# Patient Record
Sex: FEMALE | Race: WHITE | NOT HISPANIC OR LATINO | Employment: OTHER | URBAN - METROPOLITAN AREA
[De-identification: names, ages, dates, MRNs, and addresses within clinical notes are randomized per-mention and may not be internally consistent; named-entity substitution may affect disease eponyms.]

---

## 2020-04-08 ENCOUNTER — HOSPITAL ENCOUNTER (INPATIENT)
Facility: HOSPITAL | Age: 85
LOS: 6 days | Discharge: NURSING, REHAB OR LONG-TERM CARE | End: 2020-04-14
Attending: INTERNAL MEDICINE | Admitting: INTERNAL MEDICINE

## 2020-04-08 DIAGNOSIS — S22.080A COMPRESSION FRACTURE OF T12 VERTEBRA, INITIAL ENCOUNTER (HCC): Primary | ICD-10-CM

## 2020-04-08 DIAGNOSIS — G47.00 PERSISTENT INSOMNIA: ICD-10-CM

## 2020-04-08 DIAGNOSIS — F41.8 MIXED ANXIETY AND DEPRESSIVE DISORDER: ICD-10-CM

## 2020-04-08 PROBLEM — N95.2 ATROPHIC VAGINITIS: Status: ACTIVE | Noted: 2020-04-08

## 2020-04-08 PROBLEM — R63.4 ABNORMAL WEIGHT LOSS: Status: ACTIVE | Noted: 2020-04-06

## 2020-04-08 PROBLEM — M81.0 OSTEOPOROSIS: Status: ACTIVE | Noted: 2017-10-04

## 2020-04-08 PROBLEM — C50.919 PRIMARY MALIGNANT NEOPLASM OF BREAST (HCC): Status: ACTIVE | Noted: 2019-09-05

## 2020-04-08 PROBLEM — K52.9 CHRONIC DIARRHEA: Status: ACTIVE | Noted: 2020-04-08

## 2020-04-08 PROBLEM — F17.200 TOBACCO DEPENDENCE SYNDROME: Status: ACTIVE | Noted: 2019-09-05

## 2020-04-08 PROBLEM — E87.6 HYPOKALEMIA: Status: ACTIVE | Noted: 2020-04-08

## 2020-04-08 PROBLEM — K76.89 LIVER CYST: Status: ACTIVE | Noted: 2017-11-09

## 2020-04-08 PROBLEM — S22.000A COMPRESSION FRACTURE OF THORACIC VERTEBRA (HCC): Status: ACTIVE | Noted: 2020-04-08

## 2020-04-08 PROBLEM — K91.5 POSTCHOLECYSTECTOMY SYNDROME: Status: ACTIVE | Noted: 2020-04-06

## 2020-04-08 PROBLEM — I10 ESSENTIAL HYPERTENSION: Status: ACTIVE | Noted: 2020-04-06

## 2020-04-08 LAB
ALBUMIN SERPL-MCNC: 3.7 G/DL (ref 3.5–5.2)
ALBUMIN/GLOB SERPL: 1.6 G/DL
ALP SERPL-CCNC: 63 U/L (ref 39–117)
ALT SERPL W P-5'-P-CCNC: 10 U/L (ref 1–33)
ANION GAP SERPL CALCULATED.3IONS-SCNC: 12 MMOL/L (ref 5–15)
AST SERPL-CCNC: 17 U/L (ref 1–32)
BASOPHILS # BLD AUTO: 0 10*3/MM3 (ref 0–0.2)
BASOPHILS NFR BLD AUTO: 0.5 % (ref 0–1.5)
BILIRUB SERPL-MCNC: 0.4 MG/DL (ref 0.2–1.2)
BUN BLD-MCNC: 6 MG/DL (ref 8–23)
BUN/CREAT SERPL: 12.8 (ref 7–25)
CALCIUM SPEC-SCNC: 8.9 MG/DL (ref 8.6–10.5)
CHLORIDE SERPL-SCNC: 104 MMOL/L (ref 98–107)
CO2 SERPL-SCNC: 29 MMOL/L (ref 22–29)
CREAT BLD-MCNC: 0.47 MG/DL (ref 0.57–1)
DEPRECATED RDW RBC AUTO: 44.2 FL (ref 37–54)
EOSINOPHIL # BLD AUTO: 0.1 10*3/MM3 (ref 0–0.4)
EOSINOPHIL NFR BLD AUTO: 0.8 % (ref 0.3–6.2)
ERYTHROCYTE [DISTWIDTH] IN BLOOD BY AUTOMATED COUNT: 14.2 % (ref 12.3–15.4)
GFR SERPL CREATININE-BSD FRML MDRD: 126 ML/MIN/1.73
GLOBULIN UR ELPH-MCNC: 2.3 GM/DL
GLUCOSE BLD-MCNC: 87 MG/DL (ref 65–99)
HCT VFR BLD AUTO: 36.2 % (ref 34–46.6)
HGB BLD-MCNC: 12.5 G/DL (ref 12–15.9)
LYMPHOCYTES # BLD AUTO: 1.8 10*3/MM3 (ref 0.7–3.1)
LYMPHOCYTES NFR BLD AUTO: 22.1 % (ref 19.6–45.3)
MCH RBC QN AUTO: 30.6 PG (ref 26.6–33)
MCHC RBC AUTO-ENTMCNC: 34.6 G/DL (ref 31.5–35.7)
MCV RBC AUTO: 88.3 FL (ref 79–97)
MONOCYTES # BLD AUTO: 0.7 10*3/MM3 (ref 0.1–0.9)
MONOCYTES NFR BLD AUTO: 8.4 % (ref 5–12)
NEUTROPHILS # BLD AUTO: 5.5 10*3/MM3 (ref 1.7–7)
NEUTROPHILS NFR BLD AUTO: 68.2 % (ref 42.7–76)
NRBC BLD AUTO-RTO: 0 /100 WBC (ref 0–0.2)
PLATELET # BLD AUTO: 241 10*3/MM3 (ref 140–450)
PMV BLD AUTO: 9.1 FL (ref 6–12)
POTASSIUM BLD-SCNC: 3.1 MMOL/L (ref 3.5–5.2)
PROT SERPL-MCNC: 6 G/DL (ref 6–8.5)
RBC # BLD AUTO: 4.09 10*6/MM3 (ref 3.77–5.28)
SODIUM BLD-SCNC: 145 MMOL/L (ref 136–145)
WBC NRBC COR # BLD: 8.1 10*3/MM3 (ref 3.4–10.8)

## 2020-04-08 PROCEDURE — 25010000002 ONDANSETRON PER 1 MG: Performed by: PHYSICIAN ASSISTANT

## 2020-04-08 PROCEDURE — 85025 COMPLETE CBC W/AUTO DIFF WBC: CPT | Performed by: PHYSICIAN ASSISTANT

## 2020-04-08 PROCEDURE — 99222 1ST HOSP IP/OBS MODERATE 55: CPT | Performed by: PHYSICIAN ASSISTANT

## 2020-04-08 PROCEDURE — 80053 COMPREHEN METABOLIC PANEL: CPT | Performed by: PHYSICIAN ASSISTANT

## 2020-04-08 RX ORDER — POTASSIUM CHLORIDE 20 MEQ/1
40 TABLET, EXTENDED RELEASE ORAL AS NEEDED
Status: DISCONTINUED | OUTPATIENT
Start: 2020-04-08 | End: 2020-04-09

## 2020-04-08 RX ORDER — CHOLECALCIFEROL (VITAMIN D3) 125 MCG
5 CAPSULE ORAL NIGHTLY PRN
Status: DISCONTINUED | OUTPATIENT
Start: 2020-04-08 | End: 2020-04-14 | Stop reason: HOSPADM

## 2020-04-08 RX ORDER — TEMAZEPAM 15 MG/1
15 CAPSULE ORAL NIGHTLY PRN
Status: ON HOLD | COMMUNITY
End: 2020-04-14 | Stop reason: SDUPTHER

## 2020-04-08 RX ORDER — DEXTROSE MONOHYDRATE 25 G/50ML
25 INJECTION, SOLUTION INTRAVENOUS
Status: DISCONTINUED | OUTPATIENT
Start: 2020-04-08 | End: 2020-04-09

## 2020-04-08 RX ORDER — NICOTINE POLACRILEX 4 MG
15 LOZENGE BUCCAL
Status: DISCONTINUED | OUTPATIENT
Start: 2020-04-08 | End: 2020-04-09

## 2020-04-08 RX ORDER — HYDRALAZINE HYDROCHLORIDE 20 MG/ML
10 INJECTION INTRAMUSCULAR; INTRAVENOUS EVERY 6 HOURS PRN
Status: DISCONTINUED | OUTPATIENT
Start: 2020-04-08 | End: 2020-04-09

## 2020-04-08 RX ORDER — ACETAMINOPHEN 160 MG/5ML
650 SOLUTION ORAL EVERY 4 HOURS PRN
Status: DISCONTINUED | OUTPATIENT
Start: 2020-04-08 | End: 2020-04-09

## 2020-04-08 RX ORDER — SODIUM CHLORIDE 0.9 % (FLUSH) 0.9 %
10 SYRINGE (ML) INJECTION EVERY 12 HOURS SCHEDULED
Status: DISCONTINUED | OUTPATIENT
Start: 2020-04-08 | End: 2020-04-14 | Stop reason: HOSPADM

## 2020-04-08 RX ORDER — DOCUSATE SODIUM 100 MG/1
100 CAPSULE, LIQUID FILLED ORAL 2 TIMES DAILY PRN
Status: DISCONTINUED | OUTPATIENT
Start: 2020-04-08 | End: 2020-04-09

## 2020-04-08 RX ORDER — ONDANSETRON 4 MG/1
4 TABLET, FILM COATED ORAL EVERY 6 HOURS PRN
Status: DISCONTINUED | OUTPATIENT
Start: 2020-04-08 | End: 2020-04-14 | Stop reason: HOSPADM

## 2020-04-08 RX ORDER — SODIUM CHLORIDE, SODIUM LACTATE, POTASSIUM CHLORIDE, CALCIUM CHLORIDE 600; 310; 30; 20 MG/100ML; MG/100ML; MG/100ML; MG/100ML
75 INJECTION, SOLUTION INTRAVENOUS CONTINUOUS
Status: DISCONTINUED | OUTPATIENT
Start: 2020-04-08 | End: 2020-04-09

## 2020-04-08 RX ORDER — TEMAZEPAM 15 MG/1
15 CAPSULE ORAL NIGHTLY PRN
Status: DISCONTINUED | OUTPATIENT
Start: 2020-04-08 | End: 2020-04-14 | Stop reason: HOSPADM

## 2020-04-08 RX ORDER — MAGNESIUM SULFATE HEPTAHYDRATE 40 MG/ML
2 INJECTION, SOLUTION INTRAVENOUS AS NEEDED
Status: DISCONTINUED | OUTPATIENT
Start: 2020-04-08 | End: 2020-04-09

## 2020-04-08 RX ORDER — ONDANSETRON 2 MG/ML
4 INJECTION INTRAMUSCULAR; INTRAVENOUS EVERY 6 HOURS PRN
Status: DISCONTINUED | OUTPATIENT
Start: 2020-04-08 | End: 2020-04-14 | Stop reason: HOSPADM

## 2020-04-08 RX ORDER — MONTELUKAST SODIUM 4 MG/1
1 TABLET, CHEWABLE ORAL 2 TIMES DAILY
COMMUNITY
End: 2020-04-14 | Stop reason: HOSPADM

## 2020-04-08 RX ORDER — BISACODYL 10 MG
10 SUPPOSITORY, RECTAL RECTAL DAILY PRN
Status: DISCONTINUED | OUTPATIENT
Start: 2020-04-08 | End: 2020-04-14 | Stop reason: HOSPADM

## 2020-04-08 RX ORDER — SODIUM CHLORIDE 0.9 % (FLUSH) 0.9 %
10 SYRINGE (ML) INJECTION AS NEEDED
Status: DISCONTINUED | OUTPATIENT
Start: 2020-04-08 | End: 2020-04-14 | Stop reason: HOSPADM

## 2020-04-08 RX ORDER — MAGNESIUM SULFATE HEPTAHYDRATE 40 MG/ML
4 INJECTION, SOLUTION INTRAVENOUS AS NEEDED
Status: DISCONTINUED | OUTPATIENT
Start: 2020-04-08 | End: 2020-04-09

## 2020-04-08 RX ORDER — ALUMINA, MAGNESIA, AND SIMETHICONE 2400; 2400; 240 MG/30ML; MG/30ML; MG/30ML
15 SUSPENSION ORAL EVERY 6 HOURS PRN
Status: DISCONTINUED | OUTPATIENT
Start: 2020-04-08 | End: 2020-04-14 | Stop reason: HOSPADM

## 2020-04-08 RX ORDER — ACETAMINOPHEN 325 MG/1
650 TABLET ORAL EVERY 4 HOURS PRN
Status: DISCONTINUED | OUTPATIENT
Start: 2020-04-08 | End: 2020-04-09

## 2020-04-08 RX ORDER — ACETAMINOPHEN 650 MG/1
650 SUPPOSITORY RECTAL EVERY 4 HOURS PRN
Status: DISCONTINUED | OUTPATIENT
Start: 2020-04-08 | End: 2020-04-09

## 2020-04-08 RX ORDER — CALCIUM CARBONATE 200(500)MG
2 TABLET,CHEWABLE ORAL 2 TIMES DAILY PRN
Status: DISCONTINUED | OUTPATIENT
Start: 2020-04-08 | End: 2020-04-09

## 2020-04-08 RX ORDER — POTASSIUM CHLORIDE 1.5 G/1.77G
40 POWDER, FOR SOLUTION ORAL AS NEEDED
Status: DISCONTINUED | OUTPATIENT
Start: 2020-04-08 | End: 2020-04-09

## 2020-04-08 RX ADMIN — POTASSIUM CHLORIDE 40 MEQ: 1500 TABLET, EXTENDED RELEASE ORAL at 23:42

## 2020-04-08 RX ADMIN — SODIUM CHLORIDE, SODIUM LACTATE, POTASSIUM CHLORIDE, AND CALCIUM CHLORIDE 75 ML/HR: 600; 310; 30; 20 INJECTION, SOLUTION INTRAVENOUS at 23:42

## 2020-04-08 RX ADMIN — ONDANSETRON 4 MG: 2 INJECTION INTRAMUSCULAR; INTRAVENOUS at 23:42

## 2020-04-08 RX ADMIN — TEMAZEPAM 15 MG: 15 CAPSULE ORAL at 23:42

## 2020-04-08 RX ADMIN — Medication 10 ML: at 23:42

## 2020-04-09 ENCOUNTER — APPOINTMENT (OUTPATIENT)
Dept: MRI IMAGING | Facility: HOSPITAL | Age: 85
End: 2020-04-09

## 2020-04-09 LAB
ANION GAP SERPL CALCULATED.3IONS-SCNC: 11 MMOL/L (ref 5–15)
BASOPHILS # BLD AUTO: 0 10*3/MM3 (ref 0–0.2)
BASOPHILS NFR BLD AUTO: 0.5 % (ref 0–1.5)
BUN BLD-MCNC: 7 MG/DL (ref 8–23)
BUN/CREAT SERPL: 14.9 (ref 7–25)
CALCIUM SPEC-SCNC: 8.6 MG/DL (ref 8.6–10.5)
CHLORIDE SERPL-SCNC: 105 MMOL/L (ref 98–107)
CO2 SERPL-SCNC: 28 MMOL/L (ref 22–29)
CREAT BLD-MCNC: 0.47 MG/DL (ref 0.57–1)
DEPRECATED RDW RBC AUTO: 44.6 FL (ref 37–54)
EOSINOPHIL # BLD AUTO: 0.1 10*3/MM3 (ref 0–0.4)
EOSINOPHIL NFR BLD AUTO: 0.9 % (ref 0.3–6.2)
ERYTHROCYTE [DISTWIDTH] IN BLOOD BY AUTOMATED COUNT: 14.1 % (ref 12.3–15.4)
GFR SERPL CREATININE-BSD FRML MDRD: 126 ML/MIN/1.73
GLUCOSE BLD-MCNC: 81 MG/DL (ref 65–99)
GLUCOSE BLDC GLUCOMTR-MCNC: 104 MG/DL (ref 70–105)
GLUCOSE BLDC GLUCOMTR-MCNC: 127 MG/DL (ref 70–105)
GLUCOSE BLDC GLUCOMTR-MCNC: 79 MG/DL (ref 70–105)
HCT VFR BLD AUTO: 36.1 % (ref 34–46.6)
HGB BLD-MCNC: 12 G/DL (ref 12–15.9)
LYMPHOCYTES # BLD AUTO: 1.3 10*3/MM3 (ref 0.7–3.1)
LYMPHOCYTES NFR BLD AUTO: 19.2 % (ref 19.6–45.3)
MCH RBC QN AUTO: 29.8 PG (ref 26.6–33)
MCHC RBC AUTO-ENTMCNC: 33.3 G/DL (ref 31.5–35.7)
MCV RBC AUTO: 89.3 FL (ref 79–97)
MONOCYTES # BLD AUTO: 0.6 10*3/MM3 (ref 0.1–0.9)
MONOCYTES NFR BLD AUTO: 8.2 % (ref 5–12)
NEUTROPHILS # BLD AUTO: 4.9 10*3/MM3 (ref 1.7–7)
NEUTROPHILS NFR BLD AUTO: 71.2 % (ref 42.7–76)
NRBC BLD AUTO-RTO: 0.1 /100 WBC (ref 0–0.2)
PLATELET # BLD AUTO: 226 10*3/MM3 (ref 140–450)
PMV BLD AUTO: 9 FL (ref 6–12)
POTASSIUM BLD-SCNC: 3.5 MMOL/L (ref 3.5–5.2)
RBC # BLD AUTO: 4.04 10*6/MM3 (ref 3.77–5.28)
SODIUM BLD-SCNC: 144 MMOL/L (ref 136–145)
WBC NRBC COR # BLD: 6.8 10*3/MM3 (ref 3.4–10.8)

## 2020-04-09 PROCEDURE — 85025 COMPLETE CBC W/AUTO DIFF WBC: CPT | Performed by: PHYSICIAN ASSISTANT

## 2020-04-09 PROCEDURE — 99232 SBSQ HOSP IP/OBS MODERATE 35: CPT | Performed by: INTERNAL MEDICINE

## 2020-04-09 PROCEDURE — 80048 BASIC METABOLIC PNL TOTAL CA: CPT | Performed by: PHYSICIAN ASSISTANT

## 2020-04-09 PROCEDURE — 82962 GLUCOSE BLOOD TEST: CPT

## 2020-04-09 RX ORDER — LORAZEPAM 0.5 MG/1
0.5 TABLET ORAL ONCE
Status: COMPLETED | OUTPATIENT
Start: 2020-04-09 | End: 2020-04-09

## 2020-04-09 RX ORDER — MELATONIN
1000 DAILY
Status: DISCONTINUED | OUTPATIENT
Start: 2020-04-09 | End: 2020-04-14 | Stop reason: HOSPADM

## 2020-04-09 RX ORDER — ACETAMINOPHEN 500 MG
1000 TABLET ORAL 3 TIMES DAILY
Status: DISPENSED | OUTPATIENT
Start: 2020-04-09 | End: 2020-04-11

## 2020-04-09 RX ORDER — DOCUSATE SODIUM 100 MG/1
100 CAPSULE, LIQUID FILLED ORAL 2 TIMES DAILY
Status: DISCONTINUED | OUTPATIENT
Start: 2020-04-09 | End: 2020-04-11

## 2020-04-09 RX ORDER — LIDOCAINE 50 MG/G
1 PATCH TOPICAL
Status: DISCONTINUED | OUTPATIENT
Start: 2020-04-09 | End: 2020-04-14 | Stop reason: HOSPADM

## 2020-04-09 RX ORDER — CALCIUM CARBONATE 200(500)MG
1 TABLET,CHEWABLE ORAL DAILY
Status: DISCONTINUED | OUTPATIENT
Start: 2020-04-09 | End: 2020-04-14 | Stop reason: HOSPADM

## 2020-04-09 RX ADMIN — ACETAMINOPHEN 1000 MG: 500 TABLET, FILM COATED ORAL at 15:50

## 2020-04-09 RX ADMIN — ACETAMINOPHEN 1000 MG: 500 TABLET, FILM COATED ORAL at 08:16

## 2020-04-09 RX ADMIN — TEMAZEPAM 15 MG: 15 CAPSULE ORAL at 20:12

## 2020-04-09 RX ADMIN — CALCIUM CARBONATE (ANTACID) CHEW TAB 500 MG 1 TABLET: 500 CHEW TAB at 08:16

## 2020-04-09 RX ADMIN — Medication 10 ML: at 20:12

## 2020-04-09 RX ADMIN — LIDOCAINE 1 PATCH: 50 PATCH TOPICAL at 08:17

## 2020-04-09 RX ADMIN — Medication 10 ML: at 08:17

## 2020-04-09 RX ADMIN — DOCUSATE SODIUM 100 MG: 100 CAPSULE, LIQUID FILLED ORAL at 08:16

## 2020-04-09 RX ADMIN — MELATONIN 1000 UNITS: at 13:37

## 2020-04-09 RX ADMIN — DOCUSATE SODIUM 100 MG: 100 CAPSULE, LIQUID FILLED ORAL at 20:11

## 2020-04-09 RX ADMIN — ACETAMINOPHEN 1000 MG: 500 TABLET, FILM COATED ORAL at 20:11

## 2020-04-09 RX ADMIN — LORAZEPAM 0.5 MG: 0.5 TABLET ORAL at 11:30

## 2020-04-09 RX ADMIN — MELATONIN TAB 5 MG 5 MG: 5 TAB at 20:12

## 2020-04-09 NOTE — H&P
[facility] Medicine Services    Patient Name: Carly Swift  : 1933  MRN: [id number]  Primary Care Physician: Otis Armstrong MD  Date of admission: 2020    Patient Care Team:  Otis Armstrong MD as PCP - General (Family Medicine)    Subjective   History Present Illness     Chief Complaint: weight loss, diarrhea, back pain    Ms. Swift is a 86 y.o. with a past medical history of COPD, HTN, GERD, anxiety/depression/insomnia, DDD and h/o breast cancer who presents to [facility]  from [facility] with complaints of chronic diarrhea, weight loss and generalized back pain.  The patient is a poor historian.  The patient states she called her PCP today and was advised to go to the ED since she continued to lose weight with her chronic diarrhea over the last two months.  Reportedly, the patient was started on Lomotil and admits to improvement.  She states she fell a couple of days ago, but isn't sure exactly how it happened.  She thinks she fell due to weakness.  The patient states she fell inside her house.  Over the last two days, she reports she was able to walk around and was with her daughter without concerns.  Today, she called her daughter and reported she was in severe pain.  The patient was seen in the ED and transferred to [place].  She denies recent cough, shortness of air, fever, chills, travel or ill contacts.  She states she lives alone.     In the ED, the patient's labs included the following: Na 141, K 2.7, BUN 10, Cr 0.55, glucose 89, WBC 6.5, hgb 12.1, plts 234.  EKG: SR, HR 67.  CT lumbar spine: 1st and 3rd compression deformities with 60% height loss in L3.  CT thoracic spine: severe T12 fracture, mild to moderate T11 fracture.  The patient was given potassium replacement and fluids in the ED.  I discussed the patient with Dr. Barrientos (ED physician at [facility]) and informed him to call neurosurgery to see if they would accept the  patient for transfer.  He stated he did not want to call them, and said the patient needed to be transferred even if we only put a brace on her.  I called the neurosurgeon on call (Dr. Munoz) and left a voicemail.  I did not receive a call back and the patient was transferred to [place].      History of Present Illness    Review of Systems   Constitution: Negative for chills and fever.   Respiratory: Negative for cough and shortness of breath.    Musculoskeletal: Positive for back pain and falls.   Gastrointestinal: Positive for diarrhea. Negative for abdominal pain, nausea and vomiting.   Neurological: Positive for weakness.   All other systems reviewed and are negative.    Personal History     Past Medical History:   Past Medical History:   Diagnosis Date   • Chronic diarrhea 4/8/2020   • COPD (chronic obstructive pulmonary disease) (CMS/[facility]) 12/5/2014   • Essential hypertension 4/6/2020   • GERD (gastroesophageal reflux disease) 12/5/2014   • Mixed anxiety and depressive disorder 4/8/2020   • Persistent insomnia 4/6/2020     Surgical History:    History reviewed. No pertinent surgical history.    Family History: family history includes No Known Problems in her father and mother.     Social History:  reports that she has quit smoking. She does not have any smokeless tobacco history on file.    Medications:  Prior to Admission medications    Not on File       Allergies:  No Known Allergies    Objective   Objective     Vital Signs  Temp:  [98.1 °F (36.7 °C)] 98.1 °F (36.7 °C)  Heart Rate:  [71] 71  Resp:  [16] 16  BP: (167)/(69) 167/69  SpO2:  [96 %] 96 %  on   ;   Device (Oxygen Therapy): room air  Body mass index is 18.13 kg/m².    Physical Exam   Constitutional: She is oriented to person, place, and time. No distress.   Cachectic    HENT:   Head: Normocephalic and atraumatic.   Eyes: Pupils are equal, round, and reactive to light. EOM are normal.   Neck: Normal range of motion. Neck supple.   Cardiovascular:  Normal rate, regular rhythm and normal heart sounds. Exam reveals no gallop and no friction rub.   No murmur heard.  Pulmonary/Chest: Effort normal and breath sounds normal. No stridor. No respiratory distress. She has no wheezes.   Abdominal: Soft. Bowel sounds are normal. She exhibits no distension. There is no tenderness. There is no guarding.   Musculoskeletal: She exhibits no edema.   Decreased ROM d/t back pain    Neurological: She is alert and oriented to person, place, and time.   Skin: Skin is warm and dry. She is not diaphoretic.   Psychiatric: She has a normal mood and affect.   Vitals reviewed.    Results Review:              Invalid input(s):  ALKPHOS  CrCl cannot be calculated (No successful lab value found.).  Brief Urine Lab Results     None          Microbiology Results (last 10 days)     ** No results found for the last 240 hours. **          ECG/EMG Results (most recent)     None                    No radiology results for the last 7 days      CrCl cannot be calculated (No successful lab value found.).    Assessment/Plan   Assessment/Plan       Active Hospital Problems    Diagnosis  POA   • **Compression fracture of T12 vertebra (CMS/[facility]) [S22.080A]  Yes     Priority: High   • Hypokalemia [E87.6]  Yes     Priority: Medium   • Chronic diarrhea [K52.9]  Yes     Priority: Medium   • Weakness [R53.1]  Yes     Priority: Medium   • Mixed anxiety and depressive disorder [F41.8]  Yes   • Essential hypertension [I10]  Yes   • Persistent insomnia [G47.00]  Yes   • COPD (chronic obstructive pulmonary disease) (CMS/[facility]) [J44.9]  Yes   • GERD (gastroesophageal reflux disease) [K21.9]  Yes      Resolved Hospital Problems   No resolved problems to display.     Multiple compression fractures s/p fall at home   - CT lumbar spine: 1st and 3rd compression deformities with 60% height loss in L3  - CT thoracic spine: severe T12 fracture, mild to moderate T11 fracture  - EKG: SR, HR 67  - neurosurgery consulted   -  fall precautions   - IV lactated ringers @ 75/hr   - regular diet     Acute hypokalemia (2.7)  - potassium replaced in ED  - replacement protocol ordered   - monitor for improvement     Generalized weakness  - likely multifactorial secondary to deconditioning, poor oral intake, electrolyte abnormalities and chronic diarrhea  - PT consulted     Chronic diarrhea  - reportedly no change in habits    - patient recently started on Lomotil     ** patient unable to verify home meds **    Essential hypertension, chronic    COPD   - without acute exacerbation     Anxiety / Depression / Insomnia     GERD    H/o breast cancer     Malnutrition (BMI 18.13)  - nutrition consulted  - encourage oral intake     ** DNR / DNI **    VTE Prophylaxis - bilateral SCDs     Mechanical Order History:      Ordered        04/08/20 2223  Place Sequential Compression Device  Once         04/08/20 2223  Maintain Sequential Compression Device  Continuous                 Pharmalogical Order History:     None          CODE STATUS:    Code Status and Medical Interventions:   Ordered at: 04/08/20 2223     Level Of Support Discussed With:    Patient     Code Status:    No CPR     Medical Interventions (Level of Support Prior to Arrest):    Comfort Measures     I discussed the patient's findings and my recommendations with patient.    Electronically signed by Sarina Esqueda PA-C, 04/08/20, 10:23 PM.  [facility] Hospitalist Team

## 2020-04-09 NOTE — PLAN OF CARE
Problem: Patient Care Overview  Goal: Plan of Care Review  Outcome: Ongoing (interventions implemented as appropriate)  Flowsheets  Taken 4/9/2020 1311  Progress: no change  Outcome Summary: Pt was seen by Neurosurgery and MRI was ordered. Pt was anxious and states she's claustrophobic, I notified Dr and gave ativan per doctor order. Pt was not able to complete MRI. Notified Neurosurgery. Spoke with daughter Tiesha also MIKE and she states she feels her mother can't take care of herself at home. PT to evaluate. Will continue to monitor.  Taken 4/9/2020 0705  Plan of Care Reviewed With: patient

## 2020-04-09 NOTE — PROGRESS NOTES
"      [facility] Medicine Services Daily Progress Note      Hospitalist Team  LOS 1 days      Patient Care Team:  Otis Armstrong MD as PCP - General (Family Medicine)    Patient Location: 360/1      Subjective   Subjective     Chief Complaint / Subjective  No chief complaint on file.        Brief Synopsis of Hospital Course/HPI   86 y.o. with   COPD, HTN, GERD, anxiety/depression/insomnia, DDD and h/o breast cancer who presents  from [facility] with complaints of chronic diarrhea, weight loss and generalized back pain.   a poor historian.   continued to lose weight with her chronic diarrhea over the last two months.  Reportedly, the patient was started on Lomotil and admits to improvement.    fell a couple of days ago, but isn't sure exactly how it happened.  She thinks she fell due to weakness.  The patient states she fell inside her house.  Over the last two days, she reports she was able to walk around and was with her daughter without concerns.  Today, she called her daughter and reported she was in severe pain.    She denies recent cough, shortness of air, fever, chills, travel or ill contacts.  She states she lives alone.             Date::  4/9- denies severe pain but states she just has sore in the back        ROS  Constitution: Negative for chills and fever.   Respiratory: Negative for cough and shortness of breath.    Musculoskeletal: Positive for back pain and falls.   Gastrointestinal: Positive for diarrhea. Negative for abdominal pain, nausea and vomiting.   Neurological: Positive for weakness.   All other systems reviewed and are negative.    Objective   Objective      Vital Signs  Temp:  [97.8 °F (36.6 °C)-98.1 °F (36.7 °C)] 97.8 °F (36.6 °C)  Heart Rate:  [67-71] 67  Resp:  [16-17] 17  BP: (150-167)/(69-79) 150/79  Oxygen Therapy  SpO2: 95 %  Device (Oxygen Therapy): room air  Flowsheet Rows      First Filed Value   Admission Height  152.4 cm (60\") Documented at " 04/08/2020 2100   Admission Weight  42.1 kg (92 lb 13 oz) Documented at 04/08/2020 2100        Intake & Output (last 3 days)       04/06 0701 - 04/07 0700 04/07 0701 - 04/08 0700 04/08 0701 - 04/09 0700 04/09 0701 - 04/10 0700    P.O.    300    Total Intake(mL/kg)    300 (7.1)    Urine (mL/kg/hr)    500 (2.1)    Total Output    500    Net    -200                Lines, Drains & Airways    Active LDAs     Name:   Placement date:   Placement time:   Site:   Days:    Peripheral IV 04/08/20 2230 Anterior;Left Forearm   04/08/20 2230    Forearm   less than 1                  Physical Exam:    Physical Exam    Constitutional: She is oriented to person, place, and time. No distress.   Cachectic    HENT:   Head: Normocephalic and atraumatic.   Eyes: Pupils are equal, round, and reactive to light. EOM are normal.   Neck: Normal range of motion. Neck supple.   Cardiovascular: Normal rate, regular rhythm and normal heart sounds. Exam reveals no gallop and no friction rub.   No murmur heard.  Pulmonary/Chest: Effort normal and breath sounds normal. No stridor. No respiratory distress. She has no wheezes.   Abdominal: Soft. Bowel sounds are normal. She exhibits no distension. There is no tenderness. There is no guarding.   Musculoskeletal: She exhibits no edema.   Decreased ROM d/t back pain    Neurological: She is alert and oriented to person, place, and time.   Skin: Skin is warm and dry. She is not diaphoretic.   Psychiatric: She has a normal mood and affect.   Vitals reviewed.      Procedures:              Results Review:     I reviewed the patient's new clinical results.      Lab Results (last 24 hours)     Procedure Component Value Units Date/Time    POC Glucose Once [[id number]]  (Abnormal) Collected:  04/09/20 1134    Specimen:  Blood Updated:  04/09/20 1135     Glucose 127 mg/dL      Comment: Serial Number: [id number]:  [id number]       POC Glucose Once [[id number]]  (Normal) Collected:  04/09/20 0653    Specimen:   Blood Updated:  04/09/20 0654     Glucose 79 mg/dL      Comment: Serial Number: [id number]:  [id number]       Basic Metabolic Panel [[id number]]  (Abnormal) Collected:  04/09/20 0306    Specimen:  Blood Updated:  04/09/20 0526     Glucose 81 mg/dL      BUN 7 mg/dL      Creatinine 0.47 mg/dL      Sodium 144 mmol/L      Potassium 3.5 mmol/L      Chloride 105 mmol/L      CO2 28.0 mmol/L      Calcium 8.6 mg/dL      eGFR Non African Amer 126 mL/min/1.73      BUN/Creatinine Ratio 14.9     Anion Gap 11.0 mmol/L     Narrative:       GFR Normal >60  Chronic Kidney Disease <60  Kidney Failure <15      CBC & Differential [[id number]] Collected:  04/09/20 0306    Specimen:  Blood Updated:  04/09/20 0452    Narrative:       The following orders were created for panel order CBC & Differential.  Procedure                               Abnormality         Status                     ---------                               -----------         ------                     CBC Auto Differential[[id number]]        Abnormal            Final result                 Please view results for these tests on the individual orders.    CBC Auto Differential [[id number]]  (Abnormal) Collected:  04/09/20 0306    Specimen:  Blood Updated:  04/09/20 0452     WBC 6.80 10*3/mm3      RBC 4.04 10*6/mm3      Hemoglobin 12.0 g/dL      Hematocrit 36.1 %      MCV 89.3 fL      MCH 29.8 pg      MCHC 33.3 g/dL      RDW 14.1 %      RDW-SD 44.6 fl      MPV 9.0 fL      Platelets 226 10*3/mm3      Neutrophil % 71.2 %      Lymphocyte % 19.2 %      Monocyte % 8.2 %      Eosinophil % 0.9 %      Basophil % 0.5 %      Neutrophils, Absolute 4.90 10*3/mm3      Lymphocytes, Absolute 1.30 10*3/mm3      Monocytes, Absolute 0.60 10*3/mm3      Eosinophils, Absolute 0.10 10*3/mm3      Basophils, Absolute 0.00 10*3/mm3      nRBC 0.1 /100 WBC     Comprehensive Metabolic Panel [[id number]]  (Abnormal) Collected:  04/08/20 2301    Specimen:  Blood Updated:  04/08/20 2341     Glucose  87 mg/dL      BUN 6 mg/dL      Creatinine 0.47 mg/dL      Sodium 145 mmol/L      Potassium 3.1 mmol/L      Chloride 104 mmol/L      CO2 29.0 mmol/L      Calcium 8.9 mg/dL      Total Protein 6.0 g/dL      Albumin 3.70 g/dL      ALT (SGPT) 10 U/L      AST (SGOT) 17 U/L      Alkaline Phosphatase 63 U/L      Total Bilirubin 0.4 mg/dL      eGFR Non African Amer 126 mL/min/1.73      Globulin 2.3 gm/dL      A/G Ratio 1.6 g/dL      BUN/Creatinine Ratio 12.8     Anion Gap 12.0 mmol/L     Narrative:       GFR Normal >60  Chronic Kidney Disease <60  Kidney Failure <15      CBC Auto Differential [[id number]]  (Normal) Collected:  04/08/20 2301    Specimen:  Blood Updated:  04/08/20 2323     WBC 8.10 10*3/mm3      RBC 4.09 10*6/mm3      Hemoglobin 12.5 g/dL      Hematocrit 36.2 %      MCV 88.3 fL      MCH 30.6 pg      MCHC 34.6 g/dL      RDW 14.2 %      RDW-SD 44.2 fl      MPV 9.1 fL      Platelets 241 10*3/mm3      Neutrophil % 68.2 %      Lymphocyte % 22.1 %      Monocyte % 8.4 %      Eosinophil % 0.8 %      Basophil % 0.5 %      Neutrophils, Absolute 5.50 10*3/mm3      Lymphocytes, Absolute 1.80 10*3/mm3      Monocytes, Absolute 0.70 10*3/mm3      Eosinophils, Absolute 0.10 10*3/mm3      Basophils, Absolute 0.00 10*3/mm3      nRBC 0.0 /100 WBC         No results found for: HGBA1C            No results found for: LIPASE  No results found for: CHOL, CHLPL, TRIG, HDL, LDL, LDLDIRECT    No results found for: INTRAOP, PREDX, FINALDX, COMDX    Microbiology Results (last 10 days)     ** No results found for the last 240 hours. **          ECG/EMG Results (most recent)     None                    No radiology results for the last 7 days        Xrays, labs reviewed personally by physician.    Medication Review:   I have reviewed the patient's current medication list      Scheduled Meds    acetaminophen 1,000 mg Oral TID   calcium carbonate 1 tablet Oral Daily   docusate sodium 100 mg Oral BID   lidocaine 1 patch Transdermal Q24H      polyethylene glycol 17 g Oral Daily   sodium chloride 10 mL Intravenous Q12H       Meds Infusions       Meds PRN  aluminum-magnesium hydroxide-simethicone  •  bisacodyl  •  magnesium hydroxide  •  melatonin  •  ondansetron **OR** ondansetron  •  sodium chloride  •  temazepam    I personally reviewed patient's x-ray films and my findings are same    I personally reviewed patient's EKG strips and my findings are same    Assessment/Plan   Assessment/Plan     Active Hospital Problems    Diagnosis  POA   • **Compression fracture of T12 vertebra (CMS/HCC) [S22.080A]  Yes   • Mixed anxiety and depressive disorder [F41.8]  Yes   • Hypokalemia [E87.6]  Yes   • Chronic diarrhea [K52.9]  Yes   • Compression fracture of thoracic vertebra (CMS/HCC) [S22.000A]  Yes   • Essential hypertension [I10]  Yes   • Persistent insomnia [G47.00]  Yes   • COPD (chronic obstructive pulmonary disease) (CMS/[facility]) [J44.9]  Yes   • GERD (gastroesophageal reflux disease) [K21.9]  Yes   • Weakness [R53.1]  Yes      Resolved Hospital Problems   No resolved problems to display.       MEDICAL DECISION MAKING COMPLEXITY BY PROBLEM:   Recent falls    Compression fx, multiple  -L1,3, L3 60% height loss  - mild to mod T11, severe T12  - unable to do MRI  - neurosurgery on board  - will need brace  - added Lidoderm patch  - tylenol tid for 2 days and prn  - PT consult  - add vit D and calcium for osteoporosis  - colace and miralax for constipation prevention      Hypokalemia  - replaced      Chronic diarrhea  - lomotil prn      HTN  COPD without exa  Anxiety and depression  GERD  Breast cancer            VTE Prophylaxis -   Mechanical Order History:      Ordered        04/08/20 2223  Place Sequential Compression Device  Once         04/08/20 2223  Maintain Sequential Compression Device  Continuous                 Pharmalogical Order History:     None            Code Status -   Code Status and Medical Interventions:   Ordered at: 04/08/20 2223     Level Of  Support Discussed With:    Patient     Code Status:    No CPR     Medical Interventions (Level of Support Prior to Arrest):    Comfort Measures          Discharge Planning    When pain is controlled      Destination      Coordination has not been started for this encounter.      Durable Medical Equipment      Coordination has not been started for this encounter.      Dialysis/Infusion      Coordination has not been started for this encounter.      Home Medical Care      Coordination has not been started for this encounter.      Therapy      Coordination has not been started for this encounter.      Community Resources      Coordination has not been started for this encounter.            Electronically signed by Margarita Thomas MD, 04/09/20, 12:36.  [facility] Hospitalist Team

## 2020-04-09 NOTE — CONSULTS
Nutrition Services    Patient Name:  Carly Swift  YOB: 1933  MRN: [id number]  Admit Date:  4/8/2020    Comments:   Nutrition interventions: Regular diet with Boost Plus BID to provide an additional 720 kcal and 28g Protein if consumed.      Reason for Assessment                Reason for Assessment    Reason For Assessment 4/9/20: Chronic poor intake consult, Low BMI, MST 3       Diagnosis H&P:   86 y.o. with   COPD, HTN, GERD, anxiety/depression/insomnia, DDD and h/o breast cancer who presents  from [facility] with complaints of chronic diarrhea, weight loss and generalized back pain.   a poor historian.   continued to lose weight with her chronic diarrhea over the last two months.  Reportedly, the patient was started on Lomotil and admits to improvement.    fell a couple of days ago, but isn't sure exactly how it happened.  She thinks she fell due to weakness.    Current Problems:   Compression fx  - non-surgical   - brace    Hypokalemia  - replaced    Chronic diarrhea  - Lomotil    HTN    COPD    GERD    Breast cancer         Nutrition/Diet History                Nutrition/Diet History    Narrative     4/9: Attempted to contact pt's RN and patient via telephone without success. Chart reviewed. Unable to visit patient related to COVID-19 outbreak with limited in person contact.     Functional Status Independent PTA with help from daughter as needed.       Food Allergies NKFA     Factors Affecting Nutritional Intake Chronic diarrhea, acute illness         Anthropometrics             Anthropometrics    Height 152.4cm, 60in    Weight 42.1kg, 92lb (4/8/20)       Admit Weight    Admit Weight 92lb       Ideal Body Weight (IBW)    Ideal Body Weight (IBW) 100lb    % Ideal Body Weight 92%       Usual Body Weight (UBW)    Usual Body Weight SHAKA    % Usual Body Weight SHAKA    Weight Hx  No weight hx available PTA       Body Mass Index (BMI)    BMI (kg/m2) 18.13           Labs/Medications                 Labs    Pertinent Lab Results Comments  BUN 7 low, Creat 0.47 low, Gluc  low to elev        Medications    Pertinent Medications Comments Vit D3, Colace, Miralax         Physical Findings                Physical Findings    Overall Physical Appearance 4/9: Unable to assess in person related to limited in person contact with COVID-19 outbreak.     Edema  None noted per EMR     Gastrointestinal Last BM 4/8, chronic diarrhea is noted.     Tubes none     Oral/Mouth Cavity No issues reported     Skin No breakdown noted         Estimated/Assessed Needs              Calorie Requirements     Height Used for Calorie Calculations       Weight Used for Calorie Calculations      Formula chosen for Calorie Calculations       Estimated Calorie Requirements (kcals/day)              Protein Requirements    Weight Used For Protein Calculations       Est Protein Requirement Amount (gms/kg)       Estimated Protein Requirements (gms/day)          Fluid Requirements     Estimated Fluid Requirements (mL/day)            Fluid Deficit       Desired Sodium Level (mEq/L)      Desired Sodium Level (mEq/L)      Estimated Fluid Deficit Needs (L)           Nutrition Prescription Ordered                Nutrition Prescription PO    Current PO Diet  Regular     Supplement         Nutrition Prescription EN    Enteral Route -     TF modular -     TF Delivery Method -     Current Ordered TF  -     Current Ordered Water flush  -     TF Observation  -        Nutrition Prescription TPN    TPN Route -     Current Ordered TPN Volume  -     Dextrose (gm/kcals)  -     Amino Acid (gm/kcals) -     Lipids (ML/Concentration/Frequency)  -     TPN Observation  -          Evaluation of Received Nutrient/Fluid Intake                PO Evaluation    % PO Intake 38% x2 meals        EN Evaluation    TF Changes -     TF Residual -     TF Tolerance      Average EN Delivered  -        TPN Evaluation    Total Number of Days on TPN  -           Clinical Course       Clinical Nutrition Course Details  4/8 Regular         Problem/Interventions:                     Nutrition Diagnoses Problem 1      Problem 1   Underweight related to likely inadequate calorie intake PTA as evidenced by BMI 18.13.     Nutrition Diagnoses Problem 2      Problem 2            Intervention Goal                Intervention Goal    General Meal intake greater than 50%.    Consumption of one ONS daily.         Nutrition Intervention                Nutrition Intervention    RD/Tech Action Add Boost Plus BID         Nutrition Prescription                Nutrition Prescription PO      Diet  Regular     Supplement Boost Plus BID        Nutrition Prescription EN    Enteral Prescription -        Nutrition Prescription TPN    TPN Prescription -         Monitor/Evaluation                Monitor/Evaluation    Monitor Intake, weight, labs, BM, skin             Electronically signed by:  Ofelia Mahajan RD  04/09/20 15:13

## 2020-04-09 NOTE — PROGRESS NOTES
Discharge Planning Assessment   Ibrahima     Patient Name: Carly Swift  MRN: [id number]  Today's Date: 4/9/2020    Admit Date: 4/8/2020    Discharge Needs Assessment     Row Name 04/09/20 1112       Living Environment    Lives With  alone    Unique Family Situation  States her youngest daughter Tiesha is her POA and lives down the road from her. Tiesha has come to stay with her when needed.  States she want Tiesha to make decisions for her if she is unable to answer.  She cannot remember Tiesha's last name.  Her older daughter has health problems, unable to recall her name.    Current Living Arrangements  home/apartment/condo    Primary Care Provided by  self    Provides Primary Care For  no one    Family Caregiver if Needed  child(syeda), adult    Able to Return to Prior Arrangements  yes       Resource/Environmental Concerns    Resource/Environmental Concerns  none       Transition Planning    Patient/Family Anticipates Transition to  home    Patient/Family Anticipated Services at Transition  none    Transportation Anticipated  family or friend will provide       Discharge Needs Assessment    Readmission Within the Last 30 Days  no previous admission in last 30 days    Concerns Comments  PT pending    Equipment Currently Used at Home  walker, rolling    Patient's Choice of Community Agency(s)  Pt has used [facility] in the past for home health and would use again if needed.    Discharge Coordination/Progress  DC Plan: From home alone, PT pending. [facility] used in the past.        Discharge Plan     Row Name 04/09/20 1119       Plan    Plan  DC Plan: From home alone, PT pending. [facility] used in the past.    Plan Comments  Possible vertebroplasty per Dr Trent.                  Demographic Summary     Row Name 04/09/20 1110       General Information    Admission Type  inpatient    Arrived From  emergency department From [facility]    Required Notices Provided  Important Message from Medicare     Referral Source  admission list    Reason for Consult  discharge planning    Preferred Language  English     Used During This Interaction  kim        .                  Braden Marcial RN

## 2020-04-09 NOTE — CONSULTS
Referring Provider:  Sarina Esqueda PA-C  Reason for Consultation: Compression fracture    Patient Care Team:  Otis Armstrong MD as PCP - General (Family Medicine)    Chief complaint back pain    Subjective .     History of present illness:  Ms. Swift is a 86 y.o. with a past medical history of COPD, HTN, GERD, anxiety/depression/insomnia, DDD and h/o breast cancer. Pt states she feel 2 days ago and has been experiencing progressive back pain in the upper lumbar spine.  She denies any new weakness, weight loss, fever, chills, radicular pain, urinary incontinence.  She stumbled she says and fell. She eva to [facility] in [place] where a CT was performed demonstrating    compression deformities at L3 and T11 and T12.  She was transferred to [facility].     Review of Systems  Pertinent items are noted in HPI    History  Past Medical History:   Diagnosis Date   • Chronic diarrhea 4/8/2020   • COPD (chronic obstructive pulmonary disease) (CMS/[facility]) 12/5/2014   • Essential hypertension 4/6/2020   • GERD (gastroesophageal reflux disease) 12/5/2014   • Mixed anxiety and depressive disorder 4/8/2020   • Persistent insomnia 4/6/2020   , History reviewed. No pertinent surgical history.,   Family History   Problem Relation Age of Onset   • No Known Problems Mother    • No Known Problems Father    ,   Social History     Tobacco Use   • Smoking status: Former Smoker   Substance Use Topics   • Alcohol use: Not on file   • Drug use: Not on file    and   Medications Prior to Admission   Medication Sig Dispense Refill Last Dose   • colestipol (COLESTID) 1 g tablet Take 1 g by mouth 2 (Two) Times a Day.      • temazepam (RESTORIL) 15 MG capsule Take 15 mg by mouth At Night As Needed for Sleep.          Objective     Vital Signs   Temp:  [97.8 °F (36.6 °C)-98.1 °F (36.7 °C)] 97.8 °F (36.6 °C)  Heart Rate:  [67-71] 67  Resp:  [16-17] 17  BP: (150-167)/(69-79) 150/79    Physical Exam:  Thin, frail, NAD  Alert  and oriented by 3  Speech is intact and coherent articulate with good content and production  Cranial nerves III through XII are grossly intact with pupils symmetric and reactive and no gaze paresis or nystagmus  Sensation is intact to soft touch and pinprick  in both upper and lower extremities  Proprioception is intact in both upper and lower extremities to metric  Motor strength is 5/5 in both upper and lower extremities with no focal motor deficits  Reflexes are 1+ in both upper and lower extremities with no upper motor neuron signs  Gait is nonantalgic  +TTP upper lumbar spine with no deformity  No evidence of trauma  Normal symmetric UE/LE, 2+ distal pulses  Results Review:   None      Assessment/Plan       Compression fracture of T12 vertebra (CMS/HCC)    COPD (chronic obstructive pulmonary disease) (CMS/HCC)    Essential hypertension    GERD (gastroesophageal reflux disease)    Mixed anxiety and depressive disorder    Persistent insomnia    Weakness    Hypokalemia    Chronic diarrhea    Compression fracture of thoracic vertebra (CMS/HCC)      Pt is able to stand but painful  Reported fx L3 with no films  History of breast cancer  Plan today for MRI T and L spine with and without contrast  Possible vertebroplasty as I do not feel she will be compliant in a brace    I discussed the patients findings and my recommendations with patient    Jaswant Trent MD  04/09/20  10:49

## 2020-04-09 NOTE — PLAN OF CARE
Problem: Patient Care Overview  Goal: Plan of Care Review  Outcome: Ongoing (interventions implemented as appropriate)  Flowsheets (Taken 4/9/2020 0250)  Progress: improving  Plan of Care Reviewed With: patient  Outcome Summary: patient admitted for compression fracture and hypokalemia. potassium replaced per protocol, consults for neurosurgeon. patient able to get up and walk to bathroom with 1 assist.  Goal: Individualization and Mutuality  Outcome: Ongoing (interventions implemented as appropriate)  Goal: Discharge Needs Assessment  Outcome: Ongoing (interventions implemented as appropriate)  Goal: Interprofessional Rounds/Family Conf  Outcome: Ongoing (interventions implemented as appropriate)     Problem: Fall Risk (Adult)  Goal: Identify Related Risk Factors and Signs and Symptoms  Outcome: Ongoing (interventions implemented as appropriate)  Goal: Absence of Fall  Outcome: Ongoing (interventions implemented as appropriate)     Problem: Skin Injury Risk (Adult)  Goal: Identify Related Risk Factors and Signs and Symptoms  Outcome: Ongoing (interventions implemented as appropriate)  Goal: Skin Health and Integrity  Outcome: Ongoing (interventions implemented as appropriate)

## 2020-04-10 ENCOUNTER — APPOINTMENT (OUTPATIENT)
Dept: CT IMAGING | Facility: HOSPITAL | Age: 85
End: 2020-04-10

## 2020-04-10 ENCOUNTER — APPOINTMENT (OUTPATIENT)
Dept: GENERAL RADIOLOGY | Facility: HOSPITAL | Age: 85
End: 2020-04-10

## 2020-04-10 LAB
BILIRUB UR QL STRIP: NEGATIVE
CLARITY UR: CLEAR
COLOR UR: YELLOW
GLUCOSE UR STRIP-MCNC: NEGATIVE MG/DL
HGB UR QL STRIP.AUTO: NEGATIVE
KETONES UR QL STRIP: NEGATIVE
LEUKOCYTE ESTERASE UR QL STRIP.AUTO: NEGATIVE
NITRITE UR QL STRIP: NEGATIVE
PH UR STRIP.AUTO: 7.5 [PH] (ref 5–8)
PROT UR QL STRIP: NEGATIVE
SP GR UR STRIP: 1.01 (ref 1–1.03)
UROBILINOGEN UR QL STRIP: NORMAL

## 2020-04-10 PROCEDURE — 72128 CT CHEST SPINE W/O DYE: CPT

## 2020-04-10 PROCEDURE — 99232 SBSQ HOSP IP/OBS MODERATE 35: CPT | Performed by: INTERNAL MEDICINE

## 2020-04-10 PROCEDURE — 25010000002 ONDANSETRON PER 1 MG: Performed by: PHYSICIAN ASSISTANT

## 2020-04-10 PROCEDURE — 72131 CT LUMBAR SPINE W/O DYE: CPT

## 2020-04-10 PROCEDURE — 72114 X-RAY EXAM L-S SPINE BENDING: CPT

## 2020-04-10 PROCEDURE — 25010000002 LORAZEPAM PER 2 MG: Performed by: INTERNAL MEDICINE

## 2020-04-10 PROCEDURE — 81003 URINALYSIS AUTO W/O SCOPE: CPT | Performed by: NURSE PRACTITIONER

## 2020-04-10 RX ORDER — OLANZAPINE 2.5 MG/1
2.5 TABLET ORAL 2 TIMES DAILY PRN
Status: DISCONTINUED | OUTPATIENT
Start: 2020-04-10 | End: 2020-04-14 | Stop reason: HOSPADM

## 2020-04-10 RX ORDER — LORAZEPAM 2 MG/ML
0.5 INJECTION INTRAMUSCULAR ONCE
Status: COMPLETED | OUTPATIENT
Start: 2020-04-10 | End: 2020-04-10

## 2020-04-10 RX ORDER — OLANZAPINE 2.5 MG/1
2.5 TABLET ORAL ONCE
Status: COMPLETED | OUTPATIENT
Start: 2020-04-10 | End: 2020-04-10

## 2020-04-10 RX ORDER — ALPRAZOLAM 0.25 MG/1
0.25 TABLET ORAL 3 TIMES DAILY PRN
Status: DISCONTINUED | OUTPATIENT
Start: 2020-04-10 | End: 2020-04-14 | Stop reason: HOSPADM

## 2020-04-10 RX ADMIN — ACETAMINOPHEN 1000 MG: 500 TABLET, FILM COATED ORAL at 20:25

## 2020-04-10 RX ADMIN — LIDOCAINE 1 PATCH: 50 PATCH TOPICAL at 09:00

## 2020-04-10 RX ADMIN — Medication 10 ML: at 20:26

## 2020-04-10 RX ADMIN — MELATONIN 1000 UNITS: at 09:43

## 2020-04-10 RX ADMIN — POLYETHYLENE GLYCOL 3350 17 G: 17 POWDER, FOR SOLUTION ORAL at 08:00

## 2020-04-10 RX ADMIN — DOCUSATE SODIUM 100 MG: 100 CAPSULE, LIQUID FILLED ORAL at 20:25

## 2020-04-10 RX ADMIN — LORAZEPAM 0.5 MG: 2 INJECTION, SOLUTION INTRAMUSCULAR; INTRAVENOUS at 14:30

## 2020-04-10 RX ADMIN — Medication 10 ML: at 09:45

## 2020-04-10 RX ADMIN — DOCUSATE SODIUM 100 MG: 100 CAPSULE, LIQUID FILLED ORAL at 09:43

## 2020-04-10 RX ADMIN — OLANZAPINE 2.5 MG: 2.5 TABLET, FILM COATED ORAL at 16:00

## 2020-04-10 RX ADMIN — ACETAMINOPHEN 1000 MG: 500 TABLET, FILM COATED ORAL at 09:43

## 2020-04-10 RX ADMIN — CALCIUM CARBONATE (ANTACID) CHEW TAB 500 MG 1 TABLET: 500 CHEW TAB at 09:43

## 2020-04-10 RX ADMIN — ONDANSETRON 4 MG: 2 INJECTION INTRAMUSCULAR; INTRAVENOUS at 09:53

## 2020-04-10 NOTE — PLAN OF CARE
Pt is very anxious and states that she is also depressed. Her anxiety at this time is preventing the desirable workup by Neurosurgery. This was explained to POA. Pt was also started on anxiety medication. A urine was also collected due to pt frequency and urgency throughout shift.

## 2020-04-10 NOTE — PLAN OF CARE
Problem: Patient Care Overview  Goal: Plan of Care Review  4/10/2020 0427 by Carter Nguyen RN  Flowsheets (Taken 4/10/2020 0427)  Plan of Care Reviewed With: patient  Note:   Patient without complaints during shift. States she does not want to do an MRI or any surgery. Daughter Tiesha upset other family is calling her mom, wants her a DNP. Tiesha verbally abusive on phone to day shift nurse and night charge nurse. Patient is oriented and says she wants to talk with her other family. Physical therapy to evaluate today. Possible MRI today if patient agrees. Will continue to monitor.     Problem: Fall Risk (Adult)  Goal: Identify Related Risk Factors and Signs and Symptoms  Outcome: Ongoing (interventions implemented as appropriate)     Problem: Fall Risk (Adult)  Goal: Absence of Fall  Outcome: Ongoing (interventions implemented as appropriate)     Problem: Skin Injury Risk (Adult)  Goal: Identify Related Risk Factors and Signs and Symptoms  Outcome: Ongoing (interventions implemented as appropriate)     Problem: Skin Injury Risk (Adult)  Goal: Skin Health and Integrity  Outcome: Ongoing (interventions implemented as appropriate)

## 2020-04-10 NOTE — PLAN OF CARE
Problem: Patient Care Overview  Goal: Plan of Care Review  Outcome: Ongoing (interventions implemented as appropriate)  Flowsheets (Taken 4/10/2020 3554)  Plan of Care Reviewed With: patient  Outcome Summary: Pt awaiting imaging for neurosx decision on proper intervention.  PT will continue to hold off PT eval until intervention has been determined.  PT did not enter room.

## 2020-04-10 NOTE — PROGRESS NOTES
[facility] Medicine Services Daily Progress Note      Hospitalist Team  LOS 2 days      Patient Care Team:  Otis Armstrong MD as PCP - General (Family Medicine)    Patient Location: 360/1      Subjective   Subjective     Chief Complaint / Subjective  No chief complaint on file.        Brief Synopsis of Hospital Course/HPI   86 y.o. with   COPD, HTN, GERD, anxiety/depression/insomnia, DDD and h/o breast cancer who presents  from [facility] with complaints of chronic diarrhea, weight loss and generalized back pain.   a poor historian.   continued to lose weight with her chronic diarrhea over the last two months.  Reportedly, the patient was started on Lomotil and admits to improvement.    fell a couple of days ago, but isn't sure exactly how it happened.  She thinks she fell due to weakness.  The patient states she fell inside her house.  Over the last two days, she reports she was able to walk around and was with her daughter without concerns.  Today, she called her daughter and reported she was in severe pain.    She denies recent cough, shortness of air, fever, chills, travel or ill contacts.  She states she lives alone.             Date::  4/9- denies severe pain but states she just has sore in the back  4/10- states back pain when moving. MRI not completed yesterday, CT ordered per NS        ROS  Constitution: Negative for chills and fever.   Respiratory: Negative for cough and shortness of breath.    Musculoskeletal: Positive for back pain and falls.   Gastrointestinal: Positive for diarrhea. Negative for abdominal pain, nausea and vomiting.   Neurological: Positive for weakness.   All other systems reviewed and are negative.    Objective   Objective      Vital Signs  Temp:  [97.3 °F (36.3 °C)-98 °F (36.7 °C)] 97.3 °F (36.3 °C)  Heart Rate:  [65-72] 71  Resp:  [16-18] 16  BP: (141-163)/(61-75) 148/64  Oxygen Therapy  SpO2: 98 %  Pulse Oximetry Type: Intermittent  Device  "(Oxygen Therapy): room air  Flowsheet Rows      First Filed Value   Admission Height  152.4 cm (60\") Documented at 04/08/2020 2100   Admission Weight  42.1 kg (92 lb 13 oz) Documented at 04/08/2020 2100        Intake & Output (last 3 days)       04/07 0701 - 04/08 0700 04/08 0701 - 04/09 0700 04/09 0701 - 04/10 0700 04/10 0701 - 04/11 0700    P.O.   420     Total Intake(mL/kg)   420 (10)     Urine (mL/kg/hr)   1000 (1) 100 (0.7)    Total Output   1000 100    Net   -580 -100                Lines, Drains & Airways    Active LDAs     Name:   Placement date:   Placement time:   Site:   Days:    Peripheral IV 04/08/20 2230 Anterior;Left Forearm   04/08/20 2230    Forearm   less than 1                  Physical Exam:    Physical Exam    Constitutional: She is oriented to person, place, and time. No distress.   Cachectic    HENT:   Head: Normocephalic and atraumatic.   Eyes: Pupils are equal, round, and reactive to light. EOM are normal.   Neck: Normal range of motion. Neck supple.   Cardiovascular: Normal rate, regular rhythm and normal heart sounds. Exam reveals no gallop and no friction rub.   No murmur heard.  Pulmonary/Chest: Effort normal and breath sounds normal. No stridor. No respiratory distress. She has no wheezes.   Abdominal: Soft. Bowel sounds are normal. She exhibits no distension. There is no tenderness. There is no guarding.   Musculoskeletal: She exhibits no edema.   Decreased ROM d/t back pain    Neurological: She is alert and oriented to person, place, and time.   Skin: Skin is warm and dry. She is not diaphoretic.   Psychiatric: She has a normal mood and affect.   Vitals reviewed.      Procedures:              Results Review:     I reviewed the patient's new clinical results.      Lab Results (last 24 hours)     Procedure Component Value Units Date/Time    POC Glucose Once [[id number]]  (Normal) Collected:  04/09/20 1644    Specimen:  Blood Updated:  04/09/20 1645     Glucose 104 mg/dL      Comment: " Serial Number: [id number]:  [id number]       POC Glucose Once [[id number]]  (Abnormal) Collected:  04/09/20 1134    Specimen:  Blood Updated:  04/09/20 1135     Glucose 127 mg/dL      Comment: Serial Number: [id number]:  [id number]           No results found for: HGBA1C            No results found for: LIPASE  No results found for: CHOL, CHLPL, TRIG, HDL, LDL, LDLDIRECT    No results found for: INTRAOP, PREDX, FINALDX, COMDX    Microbiology Results (last 10 days)     ** No results found for the last 240 hours. **          ECG/EMG Results (most recent)     None                    No radiology results for the last 7 days        Xrays, labs reviewed personally by physician.    Medication Review:   I have reviewed the patient's current medication list      Scheduled Meds    acetaminophen 1,000 mg Oral TID   calcium carbonate 1 tablet Oral Daily   cholecalciferol 1,000 Units Oral Daily   docusate sodium 100 mg Oral BID   lidocaine 1 patch Transdermal Q24H   polyethylene glycol 17 g Oral Daily   sodium chloride 10 mL Intravenous Q12H       Meds Infusions       Meds PRN  aluminum-magnesium hydroxide-simethicone  •  bisacodyl  •  magnesium hydroxide  •  melatonin  •  ondansetron **OR** ondansetron  •  sodium chloride  •  temazepam    I personally reviewed patient's x-ray films and my findings are same    I personally reviewed patient's EKG strips and my findings are same    Assessment/Plan   Assessment/Plan     Active Hospital Problems    Diagnosis  POA   • **Compression fracture of T12 vertebra (CMS/HCC) [S22.080A]  Yes   • Mixed anxiety and depressive disorder [F41.8]  Yes   • Hypokalemia [E87.6]  Yes   • Chronic diarrhea [K52.9]  Yes   • Compression fracture of thoracic vertebra (CMS/HCC) [S22.000A]  Yes   • Essential hypertension [I10]  Yes   • Persistent insomnia [G47.00]  Yes   • COPD (chronic obstructive pulmonary disease) (CMS/HCC) [J44.9]  Yes   • GERD (gastroesophageal reflux disease) [K21.9]  Yes   •  Weakness [R53.1]  Yes      Resolved Hospital Problems   No resolved problems to display.       MEDICAL DECISION MAKING COMPLEXITY BY PROBLEM:   Recent falls    Compression fx, multiple  -L1,3, L3 60% height loss  - mild to mod T11, severe T12 - c/w in CT.  - CT L and T spine today - L3 end plate fx, chronic. Multiple healed rib fx. Mottling of sternum and manubrium. 5mm RLL lung nodule. Small R pleural effsuion  - unable to do MRI- CT TL ordered  - neurosurgery on board - possible vertebroplasty, but pt is hesitant  - will need brace  - on Lidoderm patch  - tylenol tid for 2 days and prn  - PT consult  - add vit D and calcium for osteoporosis  - colace and miralax for constipation prevention      Panic anxiety  - xanax prn  - ativan 0.5mg iv once.      Hypokalemia  - replaced      Chronic diarrhea  - lomotil prn      HTN  COPD without exa  Anxiety and depression  GERD  Breast cancer with radiation            VTE Prophylaxis -   Mechanical Order History:      Ordered        04/08/20 2223  Place Sequential Compression Device  Once         04/08/20 2223  Maintain Sequential Compression Device  Continuous                 Pharmalogical Order History:     None            Code Status -   Code Status and Medical Interventions:   Ordered at: 04/08/20 2223     Level Of Support Discussed With:    Patient     Code Status:    No CPR     Medical Interventions (Level of Support Prior to Arrest):    Comfort Measures          Discharge Planning    When pain is controlled      Destination      Coordination has not been started for this encounter.      Durable Medical Equipment      Coordination has not been started for this encounter.      Dialysis/Infusion      Coordination has not been started for this encounter.      Home Medical Care      Coordination has not been started for this encounter.      Therapy      Coordination has not been started for this encounter.      Community Resources      Coordination has not been started for  this encounter.            Electronically signed by Margarita Thomas MD, 04/10/20, 10:38.  [facility] Hospitalist Team

## 2020-04-10 NOTE — NURSING NOTE
MIKE Motley called upset that other family members are calling into patient's room. Tiesha spoke to charge nurse. Patient is alert and oriented and says she wants to talk to other family members.

## 2020-04-10 NOTE — PROGRESS NOTES
Continued Stay Note  ANGELA Alexandra     Patient Name: Carly Swift  MRN: [id number]  Today's Date: 4/10/2020    Admit Date: 4/8/2020    Discharge Plan     Row Name 04/10/20 1454       Plan    Plan  DC Plan: PT eval pending; Previously used [facility]      Plan Comments  DC Barriers: Spinal fx, pending decision of neurosurgery to determine proper intervention, therefore PT eval has been delayed.                Jyoti Cleary RN

## 2020-04-10 NOTE — PLAN OF CARE
Pt has pain but does not appear that bad and can stand.  T12 is a planum fx with an associated fx also at T11 and L2 with 60% LVBH.  She has 25 degrees of kyphosis from L1 to T10 which does not change under flexion or extension.  At this time I can not be sure which fx is contributing but since her pain controlled we will plan to fit her in a TLSO brace tomorrow.  If we can ambulate her and she tolerates it she can go home and follow up in 1 month.  She will need to wear the brace when up greater than 45 degrees for 10 minutes.

## 2020-04-10 NOTE — PROGRESS NOTES
LOS: 2 days   Patient Care Team:  Otis Armstrong MD as PCP - General (Family Medicine)    Chief Complaint:    Back pain     Subjective     Interval History: Patient unable to complete MRI scheduled yesterday due to anxiety and claustrophobia despite sedative occasion given.         Review of Systems:    All systems were reviewed and negative except for:  Musculoskeletal: positive for  back pain  Neurological: positive for  weakness    Objective     Vital Signs  Temp:  [97.7 °F (36.5 °C)-98 °F (36.7 °C)] 97.9 °F (36.6 °C)  Heart Rate:  [65-72] 67  Resp:  [16-18] 16  BP: (141-163)/(61-75) 155/72    Physical Exam:   Patient elderly and frail appearing   Alert and oriented by 3   Speech is intact and coherent   Cranial nerves III through XII are grossly intact pupils symmetric  and reactive no gaze paresis or nystagmus   Moves all extremities      Results Review:     I reviewed the patient's new clinical results.  Discussed with Dr. Trent    Medication Review: Reviewed      Assessment/Plan       Compression fracture of T12 vertebra (CMS/HCC)    COPD (chronic obstructive pulmonary disease) (CMS/HCC)    Essential hypertension    GERD (gastroesophageal reflux disease)    Mixed anxiety and depressive disorder    Persistent insomnia    Weakness    Hypokalemia    Chronic diarrhea    Compression fracture of thoracic vertebra (CMS/HCC)    Recent falls  History of Breast Cancer    Spoke to patient today and explained the importance of obtaining an MRI of her back to look at reported fractures.  Patient stated that did not think she could handle the MRI even with more sedation and refused numerous times.      We will then proceed with a CT of her thoracic and lumbar spine to evaluate the fractures.    This patient was examined wearing appropriate personal protective equipment.     CAITLIN Pennington  04/10/20  09:40

## 2020-04-11 PROCEDURE — 99231 SBSQ HOSP IP/OBS SF/LOW 25: CPT | Performed by: NEUROLOGICAL SURGERY

## 2020-04-11 PROCEDURE — 25010000002 ONDANSETRON PER 1 MG: Performed by: PHYSICIAN ASSISTANT

## 2020-04-11 PROCEDURE — 99232 SBSQ HOSP IP/OBS MODERATE 35: CPT | Performed by: INTERNAL MEDICINE

## 2020-04-11 PROCEDURE — 97530 THERAPEUTIC ACTIVITIES: CPT

## 2020-04-11 PROCEDURE — 97161 PT EVAL LOW COMPLEX 20 MIN: CPT

## 2020-04-11 RX ADMIN — TEMAZEPAM 15 MG: 15 CAPSULE ORAL at 20:44

## 2020-04-11 RX ADMIN — ALPRAZOLAM 0.25 MG: 0.25 TABLET ORAL at 11:10

## 2020-04-11 RX ADMIN — ONDANSETRON 4 MG: 2 INJECTION INTRAMUSCULAR; INTRAVENOUS at 10:51

## 2020-04-11 RX ADMIN — Medication 10 ML: at 09:08

## 2020-04-11 RX ADMIN — LIDOCAINE 1 PATCH: 50 PATCH TOPICAL at 09:05

## 2020-04-11 RX ADMIN — MELATONIN TAB 5 MG 5 MG: 5 TAB at 20:44

## 2020-04-11 RX ADMIN — DOCUSATE SODIUM 100 MG: 100 CAPSULE, LIQUID FILLED ORAL at 09:05

## 2020-04-11 RX ADMIN — MELATONIN 1000 UNITS: at 09:05

## 2020-04-11 RX ADMIN — CALCIUM CARBONATE (ANTACID) CHEW TAB 500 MG 1 TABLET: 500 CHEW TAB at 09:05

## 2020-04-11 RX ADMIN — ALPRAZOLAM 0.25 MG: 0.25 TABLET ORAL at 15:04

## 2020-04-11 RX ADMIN — Medication 10 ML: at 20:44

## 2020-04-11 NOTE — PLAN OF CARE
Problem: Patient Care Overview  Goal: Plan of Care Review  Outcome: Ongoing (interventions implemented as appropriate)   Pt sleeping well through the night. Pt does not complain of pain.

## 2020-04-11 NOTE — PROGRESS NOTES
"   LOS: 3 days   Patient Care Team:  Otis Armstrong MD as PCP - General (Family Medicine)    Chief Complaint: Osteoporotic compression factures    Subjective     Covering for Dr. Trent. Has a T11, T12, L2 osteoporotic fractures. A TLSO brace has been ordered. Doesn't complain of much pain in bed. Moves legs well.       Subjective:  Symptoms:  Stable.    Diet:  Adequate intake.    Activity level: Impaired due to pain.    Pain:  She complains of pain that is moderate.  She reports pain is improving.  Pain is well controlled.        History taken from: patient chart RN    Objective     Vital Signs  Temp:  [97.3 °F (36.3 °C)-98.7 °F (37.1 °C)] 97.8 °F (36.6 °C)  Heart Rate:  [66-87] 66  Resp:  [16-18] 16  BP: ()/(62-73) 130/71    Objective:  General Appearance:  Comfortable.    Vital signs: (most recent): Blood pressure 130/71, pulse 66, temperature 97.8 °F (36.6 °C), temperature source Oral, resp. rate 16, height 152.4 cm (60\"), weight 41.2 kg (90 lb 13.3 oz), SpO2 94 %.  Vital signs are normal.  No fever.    Output: Producing urine and producing stool.    HEENT: Normal HEENT exam.    Lungs:  Normal effort.    Heart: Normal rate.    Chest: Symmetric chest wall expansion.   Abdomen: Abdomen is soft.  Bowel sounds are normal.   There is no abdominal tenderness.   There is no mass.   Extremities: Decreased range of motion.    Pulses: Distal pulses are intact.    Neurological: Patient is alert and oriented to person, place and time.  Normal strength.    Pupils:  Pupils are equal, round, and reactive to light.    Skin:  Warm and dry.              Results Review:     I reviewed the patient's new clinical results.  I reviewed the patient's new imaging results and agree with the interpretation.  I reviewed the patient's other test results and agree with the interpretation  DATE OF EXAM:  4/10/2020 11:45 AM     PROCEDURE:  XR SPINE LUMBAR COMPLETE W FLEX EXT-     INDICATIONS:  compression fracture   "   COMPARISON:  CT thoracic and lumbar spine from earlier today     TECHNIQUE:   A minimum of six radiologic views including bending views of the  lumbosacral spine were obtained.     FINDINGS:  The alignment is stable, with no evidence of listhesis with flexion or  extension. Compression fractures at T11, T12, and L2 are unchanged from  recent prior CT. There is no evidence of a pars defect. Mild discogenic  changes are present throughout. There is mild facet arthropathy L4-5 and  L5-S1. The soft tissues are unremarkable.      IMPRESSION:  1.Compression fractures at T11, T12, and L2 are unchanged from recent  prior CT.  2.No evidence of spinal instability with flexion or extension.  3.Degenerative changes as described above.     Electronically Signed By-DR. Dano Menendez MD On:4/10/2020 12:23 PM  This report was finalized on [id number] by DR. Dano Menendez MD.    Lab Results   Component Value Date    GLUCOSE 81 04/09/2020    BUN 7 (L) 04/09/2020    CREATININE 0.47 (L) 04/09/2020    EGFRIFNONA 126 04/09/2020    BCR 14.9 04/09/2020    K 3.5 04/09/2020    CO2 28.0 04/09/2020    CALCIUM 8.6 04/09/2020    ALBUMIN 3.70 04/08/2020    AST 17 04/08/2020    ALT 10 04/08/2020     WBC   Date Value Ref Range Status   04/09/2020 6.80 3.40 - 10.80 10*3/mm3 Final     RBC   Date Value Ref Range Status   04/09/2020 4.04 3.77 - 5.28 10*6/mm3 Final     Hemoglobin   Date Value Ref Range Status   04/09/2020 12.0 12.0 - 15.9 g/dL Final     Hematocrit   Date Value Ref Range Status   04/09/2020 36.1 34.0 - 46.6 % Final     MCV   Date Value Ref Range Status   04/09/2020 89.3 79.0 - 97.0 fL Final     MCH   Date Value Ref Range Status   04/09/2020 29.8 26.6 - 33.0 pg Final     MCHC   Date Value Ref Range Status   04/09/2020 33.3 31.5 - 35.7 g/dL Final     RDW   Date Value Ref Range Status   04/09/2020 14.1 12.3 - 15.4 % Final     RDW-SD   Date Value Ref Range Status   04/09/2020 44.6 37.0 - 54.0 fl Final     MPV   Date Value Ref  Range Status   04/09/2020 9.0 6.0 - 12.0 fL Final     Platelets   Date Value Ref Range Status   04/09/2020 226 140 - 450 10*3/mm3 Final     Neutrophil %   Date Value Ref Range Status   04/09/2020 71.2 42.7 - 76.0 % Final     Lymphocyte %   Date Value Ref Range Status   04/09/2020 19.2 (L) 19.6 - 45.3 % Final     Monocyte %   Date Value Ref Range Status   04/09/2020 8.2 5.0 - 12.0 % Final     Eosinophil %   Date Value Ref Range Status   04/09/2020 0.9 0.3 - 6.2 % Final     Basophil %   Date Value Ref Range Status   04/09/2020 0.5 0.0 - 1.5 % Final     Neutrophils, Absolute   Date Value Ref Range Status   04/09/2020 4.90 1.70 - 7.00 10*3/mm3 Final     Lymphocytes, Absolute   Date Value Ref Range Status   04/09/2020 1.30 0.70 - 3.10 10*3/mm3 Final     Monocytes, Absolute   Date Value Ref Range Status   04/09/2020 0.60 0.10 - 0.90 10*3/mm3 Final     Eosinophils, Absolute   Date Value Ref Range Status   04/09/2020 0.10 0.00 - 0.40 10*3/mm3 Final     Basophils, Absolute   Date Value Ref Range Status   04/09/2020 0.00 0.00 - 0.20 10*3/mm3 Final     nRBC   Date Value Ref Range Status   04/09/2020 0.1 0.0 - 0.2 /100 WBC Final       Medication Review:   Current Facility-Administered Medications:   •  ALPRAZolam (XANAX) tablet 0.25 mg, 0.25 mg, Oral, TID PRN, Margarita Dennis MD, 0.25 mg at 04/11/20 1110  •  aluminum-magnesium hydroxide-simethicone (MAALOX MAX) 400-400-40 MG/5ML suspension 15 mL, 15 mL, Oral, Q6H PRN, Sarina Esqueda PA-C  •  bisacodyl (DULCOLAX) suppository 10 mg, 10 mg, Rectal, Daily PRN, Sarina Esqueda PA-C  •  calcium carbonate (TUMS) chewable tablet 500 mg (200 mg elemental), 1 tablet, Oral, Daily, Margarita Dennis MD, 1 tablet at 04/11/20 0905  •  cholecalciferol (VITAMIN D3) tablet 1,000 Units, 1,000 Units, Oral, Daily, Margarita Dennis MD, 1,000 Units at 04/11/20 0905  •  lidocaine (LIDODERM) 5 % 1 patch, 1 patch, Transdermal, Q24H, Margarita Dennis MD, 1 patch at 04/11/20 0905  •  magnesium hydroxide (MILK OF  MAGNESIA) suspension 2400 mg/10mL 10 mL, 10 mL, Oral, Daily PRN, Sarina Esqueda PA-C  •  melatonin tablet 5 mg, 5 mg, Oral, Nightly PRN, Sarina Esqueda PA-C, 5 mg at 04/09/20 2012  •  OLANZapine (zyPREXA) tablet 2.5 mg, 2.5 mg, Oral, BID PRN, Margarita Dennis MD  •  ondansetron (ZOFRAN) tablet 4 mg, 4 mg, Oral, Q6H PRN **OR** ondansetron (ZOFRAN) injection 4 mg, 4 mg, Intravenous, Q6H PRN, Sarina Esqueda PA-C, 4 mg at 04/11/20 1051  •  sodium chloride 0.9 % flush 10 mL, 10 mL, Intravenous, Q12H, Sarina Esqueda PA-C, 10 mL at 04/11/20 0908  •  sodium chloride 0.9 % flush 10 mL, 10 mL, Intravenous, PRN, Sarina Esqueda PA-C  •  temazepam (RESTORIL) capsule 15 mg, 15 mg, Oral, Nightly PRN, Eliud Pratt APRN, 15 mg at 04/09/20 2012    Assessment/Plan       Compression fracture of T12 vertebra (CMS/HCC)    COPD (chronic obstructive pulmonary disease) (CMS/HCC)    Essential hypertension    GERD (gastroesophageal reflux disease)    Mixed anxiety and depressive disorder    Persistent insomnia    Weakness    Hypokalemia    Chronic diarrhea    Compression fracture of thoracic vertebra (CMS/HCC)      Assessment:    Condition: In stable condition.  Improving.   (Known T11, T12, L2 osteoporotic fractures. Nonoperative management. TLSO ordered. ).     Plan:   (See above.).       Ashish Alcantar MD  04/11/20  11:17    Time:

## 2020-04-11 NOTE — PROGRESS NOTES
[facility] Medicine Services Daily Progress Note      Hospitalist Team  LOS 3 days      Patient Care Team:  Otis Armstrong MD as PCP - General (Family Medicine)    Patient Location: 360[address]      Subjective   Subjective     Chief Complaint / Subjective  No chief complaint on file.        Brief Synopsis of Hospital Course/HPI   86 y.o. with   COPD, HTN, GERD, anxiety/depression/insomnia, DDD and h/o breast cancer who presents  from [facility] with complaints of chronic diarrhea, weight loss and generalized back pain.   a poor historian.   continued to lose weight with her chronic diarrhea over the last two months.  Reportedly, the patient was started on Lomotil and admits to improvement.    fell a couple of days ago, but isn't sure exactly how it happened.  She thinks she fell due to weakness.  The patient states she fell inside her house.  Over the last two days, she reports she was able to walk around and was with her daughter without concerns.  Today, she called her daughter and reported she was in severe pain.    She denies recent cough, shortness of air, fever, chills, travel or ill contacts.  She states she lives alone.             Date::  4/9- denies severe pain but states she just has sore in the back  4/10- states back pain when moving. MRI not completed yesterday, CT ordered per NS  4/11- today more clear in mentation. Denies back pain. Slept well last night after zyprexa  Waiting to PT eval. TLSO brace pending        ROS  Constitution: Negative for chills and fever.   Respiratory: Negative for cough and shortness of breath.    Musculoskeletal: Positive for back pain and falls.   Gastrointestinal: Positive for diarrhea. Negative for abdominal pain, nausea and vomiting.   Neurological: Positive for weakness.   All other systems reviewed and are negative.    Objective   Objective      Vital Signs  Temp:  [97.8 °F (36.6 °C)-98.7 °F (37.1 °C)] 97.8 °F (36.6 °C)  Heart Rate:  " [66-72] 66  Resp:  [16-18] 16  BP: (130-150)/(64-73) 130/71  Oxygen Therapy  SpO2: 94 %  Pulse Oximetry Type: Intermittent  Device (Oxygen Therapy): room air  Flowsheet Rows      First Filed Value   Admission Height  152.4 cm (60\") Documented at 04/08/2020 2100   Admission Weight  42.1 kg (92 lb 13 oz) Documented at 04/08/2020 2100        Intake & Output (last 3 days)       04/08 0701 - 04/09 0700 04/09 0701 - 04/10 0700 04/10 0701 - 04/11 0700 04/11 0701 - 04/12 0700    P.O.  420 442     Total Intake(mL/kg)  420 (10) 442 (10.7)     Urine (mL/kg/hr)  1000 (1) 1250 (1.3)     Total Output  1000 1250     Net  -580 -808             Stool Unmeasured Occurrence    3 x        Lines, Drains & Airways    Active LDAs     Name:   Placement date:   Placement time:   Site:   Days:    Peripheral IV 04/08/20 2230 Anterior;Left Forearm   04/08/20 2230    Forearm   less than 1                  Physical Exam:    Physical Exam    Constitutional: She is oriented to person, place, and time. No distress.   Cachectic    HENT:   Head: Normocephalic and atraumatic.   Eyes: Pupils are equal, round, and reactive to light. EOM are normal.   Neck: Normal range of motion. Neck supple.   Cardiovascular: Normal rate, regular rhythm and normal heart sounds. Exam reveals no gallop and no friction rub.   No murmur heard.  Pulmonary/Chest: Effort normal and breath sounds normal. No stridor. No respiratory distress. She has no wheezes.   Abdominal: Soft. Bowel sounds are normal. She exhibits no distension. There is no tenderness. There is no guarding.   Musculoskeletal: She exhibits no edema.   Decreased ROM d/t back pain    Neurological: She is alert and oriented to person, place, and time.   Skin: Skin is warm and dry. She is not diaphoretic.   Psychiatric: She has a normal mood and affect.   Vitals reviewed.      Procedures:              Results Review:     I reviewed the patient's new clinical results.      Lab Results (last 24 hours)     " Procedure Component Value Units Date/Time    Urinalysis With Culture If Indicated - Urine, Clean Catch [[id number]]  (Normal) Collected:  04/10/20 1447    Specimen:  Urine, Clean Catch Updated:  04/10/20 1616     Color, UA Yellow     Appearance, UA Clear     pH, UA 7.5     Specific Gravity, UA 1.008     Glucose, UA Negative     Ketones, UA Negative     Bilirubin, UA Negative     Blood, UA Negative     Protein, UA Negative     Leuk Esterase, UA Negative     Nitrite, UA Negative     Urobilinogen, UA 0.2 E.U./dL    Narrative:       Urine microscopic not indicated.        No results found for: HGBA1C            No results found for: LIPASE  No results found for: CHOL, CHLPL, TRIG, HDL, LDL, LDLDIRECT    No results found for: INTRAOP, PREDX, FINALDX, COMDX    Microbiology Results (last 10 days)     ** No results found for the last 240 hours. **          ECG/EMG Results (most recent)     None                    Xr Spine Lumbar Complete With Flex & Ext    Result Date: 4/10/2020  1.Compression fractures at T11, T12, and L2 are unchanged from recent prior CT. 2.No evidence of spinal instability with flexion or extension. 3.Degenerative changes as described above.  Electronically Signed By-DR. Dano Menendez MD On:4/10/2020 12:23 PM This report was finalized on [id number] by DR. Dano Menendez MD.    Ct Thoracic Spine Without Contrast    Result Date: 4/10/2020  1.Severe compression fracture at T12, and mild compression fracture at T11, which may be subacute. No prior imaging is available for comparison. Given history of breast cancer, pathological fractures cannot be excluded. 2.Severe mottling of sternum and manubrium. Differential considerations include postradiation changes, osseous metastasis, or sequela of prior trauma. 3.Mild degenerative changes of thoracic spine as described above. 4.5 mm right lower lobe nodule. Recommend follow-up CT chest in 3-6 months. 5.Small right pleural effusion with right basilar  atelectasis.  Electronically Signed By-DR. Dano Menendez MD On:4/10/2020 10:51 AM This report was finalized on [id number] by DR. Dano Menendez MD.    Ct Lumbar Spine Without Contrast    Result Date: 4/10/2020  1.Moderate compression fracture at L2 with associated sclerosis, which could be subacute. Given history of breast cancer, a pathological fracture cannot be excluded. 2.Multilevel degenerative changes of lumbar spine as described above.  Electronically Signed By-DR. Dano Menendez MD On:4/10/2020 10:58 AM This report was finalized on [id number] by DR. Dano Menendez MD.          Xrays, labs reviewed personally by physician.    Medication Review:   I have reviewed the patient's current medication list      Scheduled Meds    calcium carbonate 1 tablet Oral Daily   cholecalciferol 1,000 Units Oral Daily   lidocaine 1 patch Transdermal Q24H   sodium chloride 10 mL Intravenous Q12H       Meds Infusions       Meds PRN  •  ALPRAZolam  •  aluminum-magnesium hydroxide-simethicone  •  bisacodyl  •  magnesium hydroxide  •  melatonin  •  OLANZapine  •  ondansetron **OR** ondansetron  •  sodium chloride  •  temazepam    I personally reviewed patient's x-ray films and my findings are same    I personally reviewed patient's EKG strips and my findings are same    Assessment/Plan   Assessment/Plan     Active Hospital Problems    Diagnosis  POA   • **Compression fracture of T12 vertebra (CMS/HCC) [S22.080A]  Yes   • Mixed anxiety and depressive disorder [F41.8]  Yes   • Hypokalemia [E87.6]  Yes   • Chronic diarrhea [K52.9]  Yes   • Compression fracture of thoracic vertebra (CMS/HCC) [S22.000A]  Yes   • Essential hypertension [I10]  Yes   • Persistent insomnia [G47.00]  Yes   • COPD (chronic obstructive pulmonary disease) (CMS/HCC) [J44.9]  Yes   • GERD (gastroesophageal reflux disease) [K21.9]  Yes   • Weakness [R53.1]  Yes      Resolved Hospital Problems   No resolved problems to display.       MEDICAL  DECISION MAKING COMPLEXITY BY PROBLEM:   Recent falls    Compression fx, multiple, L2, T11, 12  -  L2 60% height loss in CT  - mild to mod T11, severe T12 with mild spinal stenosis -   in CT.  - CT L and T spine  - L3 end plate fx, chronic. Multiple healed rib fx. Mottling of sternum and manubrium. 5mm RLL lung nodule. Small R pleural effsuion  - unable to do MRI- CT TL result as above  - neurosurgery - nonsurgical management since pain is controlled  - TLSO brace ordered  - on Lidoderm patch  - tylenol tid for 2 days and prn  - PT consult pending  - add vit D and calcium for osteoporosis         Panic anxiety  - xanax prn  - stable      Hypokalemia  - replaced      Chronic diarrhea  - lomotil prn      Dementia with delirium  - stable. zyprexa prn    HTN  COPD without exa  Anxiety and depression  GERD  Breast cancer with radiation            VTE Prophylaxis -   Mechanical Order History:      Ordered        04/08/20 2223  Place Sequential Compression Device  Once         04/08/20 2223  Maintain Sequential Compression Device  Continuous                 Pharmalogical Order History:     None            Code Status -   Code Status and Medical Interventions:   Ordered at: 04/08/20 2223     Level Of Support Discussed With:    Patient     Code Status:    No CPR     Medical Interventions (Level of Support Prior to Arrest):    Comfort Measures          Discharge Planning    Pending PT eval, TLSO brace pending      Destination      Coordination has not been started for this encounter.      Durable Medical Equipment      Coordination has not been started for this encounter.      Dialysis/Infusion      Coordination has not been started for this encounter.      Home Medical Care      Coordination has not been started for this encounter.      Therapy      Coordination has not been started for this encounter.      Community Resources      Coordination has not been started for this encounter.            Electronically signed by Mi  Rhonda Dennis MD, 04/11/20, 13:12.  [facility] Hospitalist Team

## 2020-04-11 NOTE — DISCHARGE PLACEMENT REQUEST
"Ivette Martins (86 y.o. Female)     Date of Birth Social Security Number Address Home Phone MRN    04/25/1933  [facility] [place] [address] [phone] [id number]    [place] Mar[facility] Status          None        Admission Date Admission Type Admitting Provider Attending Provider Department, Room/Bed    4/8/20 Urgent Margarita Dennis MD Seo, Mi La, MD [facility] 3C MEDICAL INPATIENT, 360/1    Discharge Date Discharge Disposition Discharge Destination                       Attending Provider:  Margarita Dennis MD    Allergies:  No Known Allergies    Isolation:  None   Infection:  None   Code Status:  No CPR    Ht:  152.4 cm (60\")   Wt:  41.2 kg (90 lb 13.3 oz)    Admission Cmt:  None   Principal Problem:  Compression fracture of T12 vertebra (CMS/[facility]) [S22.080A]                 Active Insurance as of 4/8/2020     Primary Coverage     Payor Plan Insurance Group Employer/Plan Group    MEDICARE MEDICARE A & B      Payor Plan Address Payor Plan Phone Number Payor Plan Fax Number Effective Dates    [address] [phone]  4/1/1998 - None Entered    [facility] [address]       Subscriber Name Subscriber Birth Date Member ID       IVETTE MARTINS 4/25/1933 [id number]           Secondary Coverage     Payor Plan Insurance Group Employer/Plan Group    [place]      Payor Plan Address Payor Plan Phone Number Payor Plan Fax Number Effective Dates    [address]   1/1/2020 - None Entered    [place] [address]       Subscriber Name Subscriber Birth Date Member ID       IVETTE MARTINS 4/25/1933 [id number]                 Emergency Contacts      (Rel.) Home Phone Work Phone Mobile Phone    SHAWNEE PERALES (Daughter) [phone] -- [phone]               History & Physical      Sarina Esqueda PA-C at 04/08/20 2223     Attestation signed by Margarita Dennis MD at 04/09/20 1346    Agrees to plan                          [facility] Medicine " Services    Patient Name: Carly Swift  : 1933  MRN: [id number]  Primary Care Physician: Otis Armstrong MD  Date of admission: 2020    Patient Care Team:  Otis Armstrong MD as PCP - General (Family Medicine)    Subjective   History Present Illness     Chief Complaint: weight loss, diarrhea, back pain    Ms. Swift is a 86 y.o. with a past medical history of COPD, HTN, GERD, anxiety/depression/insomnia, DDD and h/o breast cancer who presents to [facility]  from [facility] with complaints of chronic diarrhea, weight loss and generalized back pain.  The patient is a poor historian.  The patient states she called her PCP today and was advised to go to the ED since she continued to lose weight with her chronic diarrhea over the last two months.  Reportedly, the patient was started on Lomotil and admits to improvement.  She states she fell a couple of days ago, but isn't sure exactly how it happened.  She thinks she fell due to weakness.  The patient states she fell inside her house.  Over the last two days, she reports she was able to walk around and was with her daughter without concerns.  Today, she called her daughter and reported she was in severe pain.  The patient was seen in the ED and transferred to [place].  She denies recent cough, shortness of air, fever, chills, travel or ill contacts.  She states she lives alone.     In the ED, the patient's labs included the following: Na 141, K 2.7, BUN 10, Cr 0.55, glucose 89, WBC 6.5, hgb 12.1, plts 234.  EKG: SR, HR 67.  CT lumbar spine: 1st and 3rd compression deformities with 60% height loss in L3.  CT thoracic spine: severe T12 fracture, mild to moderate T11 fracture.  The patient was given potassium replacement and fluids in the ED.  I discussed the patient with Dr. Barrientos (ED physician at [place]) and informed him to call neurosurgery to see if they would accept the patient for transfer.  He stated he did not want to  call them, and said the patient needed to be transferred even if we only put a brace on her.  I called the neurosurgeon on call (Dr. Munoz) and left a voicemail.  I did not receive a call back and the patient was transferred to [place].      History of Present Illness    Review of Systems   Constitution: Negative for chills and fever.   Respiratory: Negative for cough and shortness of breath.    Musculoskeletal: Positive for back pain and falls.   Gastrointestinal: Positive for diarrhea. Negative for abdominal pain, nausea and vomiting.   Neurological: Positive for weakness.   All other systems reviewed and are negative.    Personal History     Past Medical History:   Past Medical History:   Diagnosis Date   • Chronic diarrhea 4/8/2020   • COPD (chronic obstructive pulmonary disease) (CMS/[facility]) 12/5/2014   • Essential hypertension 4/6/2020   • GERD (gastroesophageal reflux disease) 12/5/2014   • Mixed anxiety and depressive disorder 4/8/2020   • Persistent insomnia 4/6/2020     Surgical History:    History reviewed. No pertinent surgical history.    Family History: family history includes No Known Problems in her father and mother.     Social History:  reports that she has quit smoking. She does not have any smokeless tobacco history on file.    Medications:  Prior to Admission medications    Not on File       Allergies:  No Known Allergies    Objective   Objective     Vital Signs  Temp:  [98.1 °F (36.7 °C)] 98.1 °F (36.7 °C)  Heart Rate:  [71] 71  Resp:  [16] 16  BP: (167)/(69) 167/69  SpO2:  [96 %] 96 %  on   ;   Device (Oxygen Therapy): room air  Body mass index is 18.13 kg/m².    Physical Exam   Constitutional: She is oriented to person, place, and time. No distress.   Cachectic    HENT:   Head: Normocephalic and atraumatic.   Eyes: Pupils are equal, round, and reactive to light. EOM are normal.   Neck: Normal range of motion. Neck supple.   Cardiovascular: Normal rate, regular rhythm and normal heart sounds. Exam  reveals no gallop and no friction rub.   No murmur heard.  Pulmonary/Chest: Effort normal and breath sounds normal. No stridor. No respiratory distress. She has no wheezes.   Abdominal: Soft. Bowel sounds are normal. She exhibits no distension. There is no tenderness. There is no guarding.   Musculoskeletal: She exhibits no edema.   Decreased ROM d/t back pain    Neurological: She is alert and oriented to person, place, and time.   Skin: Skin is warm and dry. She is not diaphoretic.   Psychiatric: She has a normal mood and affect.   Vitals reviewed.    Results Review:              Invalid input(s):  ALKPHOS  CrCl cannot be calculated (No successful lab value found.).  Brief Urine Lab Results     None          Microbiology Results (last 10 days)     ** No results found for the last 240 hours. **          ECG/EMG Results (most recent)     None                    No radiology results for the last 7 days      CrCl cannot be calculated (No successful lab value found.).    Assessment/Plan   Assessment/Plan       Active Hospital Problems    Diagnosis  POA   • **Compression fracture of T12 vertebra (CMS/[facility]) [S22.080A]  Yes     Priority: High   • Hypokalemia [E87.6]  Yes     Priority: Medium   • Chronic diarrhea [K52.9]  Yes     Priority: Medium   • Weakness [R53.1]  Yes     Priority: Medium   • Mixed anxiety and depressive disorder [F41.8]  Yes   • Essential hypertension [I10]  Yes   • Persistent insomnia [G47.00]  Yes   • COPD (chronic obstructive pulmonary disease) (CMS/[facility]) [J44.9]  Yes   • GERD (gastroesophageal reflux disease) [K21.9]  Yes      Resolved Hospital Problems   No resolved problems to display.     Multiple compression fractures s/p fall at home   - CT lumbar spine: 1st and 3rd compression deformities with 60% height loss in L3  - CT thoracic spine: severe T12 fracture, mild to moderate T11 fracture  - EKG: SR, HR 67  - neurosurgery consulted   - fall precautions   - IV lactated ringers @ 75/hr   - regular  "diet     Acute hypokalemia (2.7)  - potassium replaced in ED  - replacement protocol ordered   - monitor for improvement     Generalized weakness  - likely multifactorial secondary to deconditioning, poor oral intake, electrolyte abnormalities and chronic diarrhea  - PT consulted     Chronic diarrhea  - reportedly no change in habits    - patient recently started on Lomotil     ** patient unable to verify home meds **    Essential hypertension, chronic    COPD   - without acute exacerbation     Anxiety / Depression / Insomnia     GERD    H/o breast cancer     Malnutrition (BMI 18.13)  - nutrition consulted  - encourage oral intake     ** DNR / DNI **    VTE Prophylaxis - bilateral SCDs     Mechanical Order History:      Ordered        20  Place Sequential Compression Device  Once         20 2223  Maintain Sequential Compression Device  Continuous                 Pharmalogical Order History:     None          CODE STATUS:    Code Status and Medical Interventions:   Ordered at: 20     Level Of Support Discussed With:    Patient     Code Status:    No CPR     Medical Interventions (Level of Support Prior to Arrest):    Comfort Measures     I discussed the patient's findings and my recommendations with patient.    Electronically signed by Sarina Esqueda PA-C, 20, 10:23 PM.  [facility] Hospitalist Team          Electronically signed by Margarita Dennis MD at 20 1346       [address] MEDICAL INPATIENT  [address] [facility] [place] [address]  Dept. Phone:  [phone]  Dept. Fax:   Date Ordered: 2020         Patient:  Carly Swift MRN:  [id number]   [address] [place] [address] :  1933  SSN:    Phone: [phone] Sex:  F     Weight: 41.2 kg (90 lb 13.3 oz)         Ht Readings from Last 1 Encounters:   20 152.4 cm (60\")         Miscellaneous DME   (Order ID: [id number])    Diagnosis:  Compression fracture of T12 vertebra, initial " encounter (CMS/HCC) (S22.080A [ICD-10-CM] 805.2 [ICD-9-CM])   Quantity:  1     Type of DME: TLSO brace  Length of Need (99 Months = Lifetime): 99 Months = Lifetime        Authorizing Provider's Phone: [phone]   Verbal Order Mode: Telephone with readback   Authorizing Provider: Jaswant Trent MD  Authorizing Provider's NPI: [id number]     Order Entered By: Halle Rayo RN 4/11/2020 11:59 AM

## 2020-04-11 NOTE — PLAN OF CARE
Problem: Patient Care Overview  Goal: Plan of Care Review  Outcome: Ongoing (interventions implemented as appropriate)  Flowsheets  Taken 4/11/2020 1352  Progress: no change  Outcome Summary: Is currently being fitted for brace by Juan.  Taken 4/11/2020 0748  Plan of Care Reviewed With: patient  Goal: Discharge Needs Assessment  Outcome: Ongoing (interventions implemented as appropriate)  Flowsheets (Taken 4/9/2020 1112 by Braden Marcial RN)  Concerns Comments: PT pending  Goal: Interprofessional Rounds/Family Conf  Outcome: Ongoing (interventions implemented as appropriate)     Problem: Fall Risk (Adult)  Goal: Identify Related Risk Factors and Signs and Symptoms  Outcome: Ongoing (interventions implemented as appropriate)  Flowsheets (Taken 4/11/2020 1352)  Related Risk Factors (Fall Risk): age-related changes; confusion/agitation; gait/mobility problems  Goal: Absence of Fall  Outcome: Ongoing (interventions implemented as appropriate)  Flowsheets (Taken 4/11/2020 1352)  Absence of Fall: making progress toward outcome     Problem: Skin Injury Risk (Adult)  Goal: Identify Related Risk Factors and Signs and Symptoms  Outcome: Ongoing (interventions implemented as appropriate)  Flowsheets (Taken 4/11/2020 1352)  Related Risk Factors (Skin Injury Risk): advanced age; mobility impaired  Goal: Skin Health and Integrity  Outcome: Ongoing (interventions implemented as appropriate)  Flowsheets (Taken 4/11/2020 1352)  Skin Health and Integrity: making progress toward outcome

## 2020-04-11 NOTE — PLAN OF CARE
Problem: Patient Care Overview  Goal: Plan of Care Review  Outcome: Ongoing (interventions implemented as appropriate)  Flowsheets (Taken 4/11/2020 0816)  Plan of Care Reviewed With: patient  Outcome Summary: 87 yo female adm for T12 compression fx. Non-surgical rx being followed. Has TLSO, but PT unable to get brace to comfortably fit pt or correctly fit pt today. Will try again tomorrow, and if still does not fit, will contact orthotist. Pt requires mod assist to come to sitting via log rolling, min asst to come to stand at rw, and min asst to amb 25 ft w/ rw. Very unsteady, festinating gait. Pt is very frail in appearance & is very high risk for injurious falls. Will require IP rehab at d/c.  PPE worn: gloves, mask.

## 2020-04-11 NOTE — NURSING NOTE
Spoke with pts daughter gave an update on pt condition and plan of care. Spoke with pts primary nurse Evi and let her know families concerns of her going back to home alone.   I had long  Discussion with Tiesha pts daughter about waiting on PT eval to determine type of care pt would need as well as that CM is following pt and will make recommendations after PT has done evaluation. Answered all questions. Encouraged Tiesha to call nurses station with any further concerns or questions

## 2020-04-11 NOTE — PROGRESS NOTES
Discharge Planning Assessment   Ibrahima     Patient Name: Carly Swift  MRN: [id number]  Today's Date: 4/11/2020    Admit Date: 4/8/2020      Discharge Plan     Row Name 04/11/20 1254       Plan    Plan  DC plan: pending PT/OT recommendation. [facility] contacted for TSLO brace.    Plan Comments Order verified for TSLO brace. Referral sent to [facility] via [employer]. Called [facility] [phone] to notify of DME need. Awaiting return call.           Durable Medical Equipment      Service Provider Request Status Selected Services Address Phone Number Fax Number    [facility] [place] P[facility] - No Request Sent N/A [address], [facility] [address] [phone]               Halle Rayo RN

## 2020-04-11 NOTE — NURSING NOTE
Attempted to reach pts mohan Motley. Voice message left for reason for call to update on pt condition and plan of care. Informed Daughter to call nursing station with any concerns or questions

## 2020-04-11 NOTE — THERAPY EVALUATION
Patient Name: Carly Swift  : 1933    MRN: [id number]                              Today's Date: 2020       Admit Date: 2020    Visit Dx:     ICD-10-CM ICD-9-CM   1. Compression fracture of T12 vertebra, initial encounter (CMS/[facility]) S22.080A 805.2     Patient Active Problem List   Diagnosis   • Primary malignant neoplasm of breast (CMS/[facility])   • Abnormal weight loss   • Atrophic vaginitis   • COPD (chronic obstructive pulmonary disease) (CMS/[facility])   • DDD (degenerative disc disease)   • Essential hypertension   • Gait difficulty   • GERD (gastroesophageal reflux disease)   • Liver cyst   • Mixed anxiety and depressive disorder   • Osteoporosis   • Persistent insomnia   • Postcholecystectomy syndrome   • Tobacco dependence syndrome   • Weakness   • Compression fracture of T12 vertebra (CMS/[facility])   • Hypokalemia   • Chronic diarrhea   • Compression fracture of thoracic vertebra (CMS/[facility])     Past Medical History:   Diagnosis Date   • Chronic diarrhea 2020   • COPD (chronic obstructive pulmonary disease) (CMS/[facility]) 2014   • Essential hypertension 2020   • GERD (gastroesophageal reflux disease) 2014   • Mixed anxiety and depressive disorder 2020   • Persistent insomnia 2020     History reviewed. No pertinent surgical history.  General Information     Row Name 20 1553          PT Evaluation Time/Intention    Document Type  evaluation  -CM     Mode of Treatment  physical therapy  -CM     Row Name 20 1553          General Information    Patient Profile Reviewed?  yes 85 yo female adm for T12 compression fx. Hx of multiple falls at home.  -CM     Prior Level of Function  independent:;all household mobility;gait uses rw   -CM     Existing Precautions/Restrictions  fall;spinal  -CM     Barriers to Rehab  hearing deficit  -CM     Row Name 20 1553          Relationship/Environment    Lives With  alone  -CM     Row Name 20 1553          Resource/Environmental  Concerns    Current Living Arrangements  home/apartment/condo  -CM     Row Name 04/11/20 1553          Cognitive Assessment/Intervention- PT/OT    Orientation Status (Cognition)  oriented x 4  -CM     Cognitive Assessment/Intervention Comment  soft spoken  -CM     Row Name 04/11/20 1553          Safety Issues, Functional Mobility    Safety Issues Affecting Function (Mobility)  awareness of need for assistance;insight into deficits/self awareness;judgment;problem solving  -CM     Impairments Affecting Function (Mobility)  balance;coordination;grasp;strength;postural/trunk control;pain  -CM       User Key  (r) = Recorded By, (t) = Taken By, (c) = Cosigned By    Initials Name Provider Type    CM Rocio Arce, PT Physical Therapist        Mobility     Row Name 04/11/20 8405          Bed Mobility Assessment/Treatment    Bed Mobility Assessment/Treatment  bed mobility (all) activities;sidelying-sit-sidelying;rolling left;rolling right  -CM     Rolling Left [place] (Bed Mobility)  moderate assist (50% patient effort)  -CM     Rolling Right [place] (Bed Mobility)  not tested  -CM     Sidelying-Sit-Sidelying [place] (Bed Mobility)  moderate assist (50% patient effort)  -CM     Assistive Device (Bed Mobility)  bed rails  -CM     Comment (Bed Mobility)  log rolling to sitting  -CM     Row Name 04/11/20 1555          Transfer Assessment/Treatment    Comment (Transfers)  attempted to [place] brace in sitting at eob; PT unable to get sides of brace to meet, even w/ maximal effort; question if brace fits pt correctly  -CM     Row Name 04/11/20 1555          Sit-Stand Transfer    Sit-Stand [place] (Transfers)  minimum assist (75% patient effort)  -CM     Assistive Device (Sit-Stand Transfers)  walker, front-wheeled  -CM     Row Name 04/11/20 1555          Gait/Stairs Assessment/Training    Gait/Stairs Assessment/Training  gait/ambulation independence;gait/ambulation assistive device  -CM     Assistive Device  (Gait)  walker, front-wheeled  -CM     Distance in Feet (Gait)  25 ft w/o brace on; pt has no c/o pain w/ amb  -CM     Pattern (Gait)  step-through  -CM     Deviations/Abnormal Patterns (Gait)  bilateral deviations;festinating/shuffling;stride length decreased  -CM     Bilateral Gait Deviations  forward flexed posture;heel strike decreased  -CM       User Key  (r) = Recorded By, (t) = Taken By, (c) = Cosigned By    Initials Name Provider Type    CM Rocio Arce, PT Physical Therapist        Obj/Interventions     Row Name 04/11/20 1558          General ROM    GENERAL ROM COMMENTS  fixed forward kyphosis in thoracic spine; UEs limited to 50%; LEs wfl; presence of multiple abrasions and mild bruising on LLE distal to knee  -CM     Row Name 04/11/20 1558          MMT (Manual Muscle Testing)    General MMT Comments  UEs 3/5; LEs 3/5  -CM     Row Name 04/11/20 1558          Static Sitting Balance    Level of [place] (Unsupported Sitting, Static Balance)  contact guard assist  -CM     Sitting Position (Unsupported Sitting, Static Balance)  sitting on edge of bed  -CM     Row Name 04/11/20 1558          Dynamic Sitting Balance    Level of [place], Reaches Outside Midline (Sitting, Dynamic Balance)  minimal assist, 75% patient effort  -CM     Sitting Position, Reaches Outside Midline (Sitting, Dynamic Balance)  sitting on edge of bed  -CM     Row Name 04/11/20 1558          Static Standing Balance    Level of [place] (Supported Standing, Static Balance)  minimal assist, 75% patient effort  -CM     Assistive Device Utilized (Supported Standing, Static Balance)  walker, rolling  -CM     Row Name 04/11/20 1558          Dynamic Standing Balance    Level of [place], Reaches Outside Midline (Standing, Dynamic Balance)  moderate assist, 50 to 74% patient effort  -CM     Assistive Device Utilized (Supported Standing, Dynamic Balance)  walker, rolling  -CM     Row Name 04/11/20 1558          Sensory  Assessment/Intervention    Sensory General Assessment  no sensation deficits identified  -CM       User Key  (r) = Recorded By, (t) = Taken By, (c) = Cosigned By    Initials Name Provider Type    Rocio Barton, PT Physical Therapist        Goals/Plan     Row Name 04/11/20 1612          Bed Mobility Goal 1 (PT)    Activity/Assistive Device (Bed Mobility Goal 1, PT)  bed mobility activities, all  -CM     [place] Level/Cues Needed (Bed Mobility Goal 1, PT)  conditional independence log rolling in/out of supine, supine to sit  -CM     Time Frame (Bed Mobility Goal 1, PT)  2 weeks  -CM     Row Name 04/11/20 1612          Transfer Goal 1 (PT)    Activity/Assistive Device (Transfer Goal 1, PT)  transfers, all;walker, rolling  -CM     [place] Level/Cues Needed (Transfer Goal 1, PT)  supervision required  -CM     Time Frame (Transfer Goal 1, PT)  2 weeks  -CM     Row Name 04/11/20 1612          Gait Training Goal 1 (PT)    Activity/Assistive Device (Gait Training Goal 1, PT)  gait (walking locomotion);assistive device use;walker, rolling  -CM     [place] Level (Gait Training Goal 1, PT)  supervision required  -CM     Distance (Gait Goal 1, PT)  75 ft  -CM     Time Frame (Gait Training Goal 1, PT)  2 weeks  -CM       User Key  (r) = Recorded By, (t) = Taken By, (c) = Cosigned By    Initials Name Provider Type    Rocio Barton, PT Physical Therapist        Clinical Impression     Row Name 04/11/20 2561          Pain Assessment    Additional Documentation  Pain Scale: FACES Pre/Post-Treatment (Group)  -     Row Name 04/11/20 4802          Pain Scale: FACES Pre/Post-Treatment    Pain: FACES Scale, Pretreatment  0-->no hurt  -CM     Pain: FACES Scale, Post-Treatment  4-->hurts little more  -CM     Pre/Post Treatment Pain Comment  no c/o pain except w/ rolling and coming to sitting, as well as sit to sidelying; pt also c/o pain w/ attempts to [place] brace.   -     Row Name 04/11/20 7089          Plan  of Care Review    Plan of Care Reviewed With  patient  -CM     Outcome Summary  87 yo female adm for T12 compression fx. Non-surgical rx being followed. Has TLSO, but PT unable to get brace to comfortably fit pt or correctly fit pt today. Will try again tomorrow, and if still does not fit, will contact orthotist. Pt requires mod assist to come to sitting via log rolling, min asst to come to stand at rw, and min asst to amb 25 ft w/ rw. Very unsteady, festinating gait. Pt is very frail in appearance & is very high risk for injurious falls. Will require IP rehab at d/c.   -CM     Row Name 04/11/20 1553          Physical Therapy Clinical Impression    Criteria for Skilled Interventions Met (PT Clinical Impression)  yes;treatment indicated  -CM     Rehab Potential (PT Clinical Summary)  good, to achieve stated therapy goals  -CM     Predicted Duration of Therapy (PT)  until d/c  -CM     Row Name 04/11/20 1559          Vital Signs    O2 Delivery Pre Treatment  room air  -CM     O2 Delivery Post Treatment  room air  -CM     Row Name 04/11/20 7416          Positioning and Restraints    Pre-Treatment Position  in bed  -CM     Post Treatment Position  bed  -CM     In Bed  notified nsg;fowlers;exit alarm on;encouraged to call for assist;call light within reach head of bed at 45*  -CM       User Key  (r) = Recorded By, (t) = Taken By, (c) = Cosigned By    Initials Name Provider Type    Rocio Barton, PT Physical Therapist        Outcome Measures     Row Name 04/11/20 1214          How much help from another person do you currently need...    Turning from your back to your side while in flat bed without using bedrails?  2  -CM     Moving from lying on back to sitting on the side of a flat bed without bedrails?  2  -CM     Moving to and from a bed to a chair (including a wheelchair)?  3  -CM     Standing up from a chair using your arms (e.g., wheelchair, bedside chair)?  3  -CM     Climbing 3-5 steps with a railing?  1   -CM     To walk in hospital room?  3  -CM     AM-PAC 6 Clicks Score (PT)  14  -CM     Row Name 04/11/20 1614          Functional Assessment    Outcome Measure Options  AM-PAC 6 Clicks Basic Mobility (PT)  -CM       User Key  (r) = Recorded By, (t) = Taken By, (c) = Cosigned By    Initials Name Provider Type    Rocio Barton, PT Physical Therapist          PT Recommendation and Plan  Planned Therapy Interventions (PT Eval): balance training, bed mobility training, gait training, patient/family education, motor coordination training, postural re-education, ROM (range of motion), strengthening, transfer training  Outcome Summary/Treatment Plan (PT)  Anticipated Discharge Disposition (PT): inpatient rehabilitation facility  Plan of Care Reviewed With: patient  Outcome Summary: 87 yo female adm for T12 compression fx. Non-surgical rx being followed. Has TLSO, but PT unable to get brace to comfortably fit pt or correctly fit pt today. Will try again tomorrow, and if still does not fit, will contact orthotist. Pt requires mod assist to come to sitting via log rolling, min asst to come to stand at rw, and min asst to amb 25 ft w/ rw. Very unsteady, festinating gait. Pt is very frail in appearance & is very high risk for injurious falls. Will require IP rehab at d/c.      Time Calculation:   PT Charges     Row Name 04/11/20 1616 04/11/20 1223          Time Calculation    Start Time  1524  -CM  --     Stop Time  1550  -CM  --     Time Calculation (min)  26 min  -CM  --     PT Received On  04/11/20  -CM  --     PT - Next Appointment  04/12/20  -CM  04/11/20  -CM     PT Goal Re-Cert Due Date  04/25/20  -CM  --        Time Calculation- PT    Total Timed Code Minutes- PT  10 minute(s)  -CM  --       User Key  (r) = Recorded By, (t) = Taken By, (c) = Cosigned By    Initials Name Provider Type    Rocio Barton, PT Physical Therapist        Therapy Charges for Today     Code Description Service Date Service Provider  Modifiers Qty    76794271969 HC PT EVAL LOW COMPLEXITY 3 4/11/2020 Rocio Arce, PT GP 1    27183672503 HC PT THERAPEUTIC ACT EA 15 MIN 4/11/2020 Rocio Arce, PT GP 1          PT G-Codes  Outcome Measure Options: AM-PAC 6 Clicks Basic Mobility (PT)  AM-PAC 6 Clicks Score (PT): 14    Rocio Arce, PT  4/11/2020

## 2020-04-12 PROCEDURE — 99231 SBSQ HOSP IP/OBS SF/LOW 25: CPT | Performed by: NEUROLOGICAL SURGERY

## 2020-04-12 PROCEDURE — 99232 SBSQ HOSP IP/OBS MODERATE 35: CPT | Performed by: INTERNAL MEDICINE

## 2020-04-12 PROCEDURE — 97116 GAIT TRAINING THERAPY: CPT

## 2020-04-12 PROCEDURE — 97530 THERAPEUTIC ACTIVITIES: CPT

## 2020-04-12 RX ADMIN — LIDOCAINE 1 PATCH: 50 PATCH TOPICAL at 09:31

## 2020-04-12 RX ADMIN — ALPRAZOLAM 0.25 MG: 0.25 TABLET ORAL at 14:49

## 2020-04-12 RX ADMIN — MELATONIN 1000 UNITS: at 09:31

## 2020-04-12 RX ADMIN — Medication 10 ML: at 20:12

## 2020-04-12 RX ADMIN — ALPRAZOLAM 0.25 MG: 0.25 TABLET ORAL at 20:11

## 2020-04-12 RX ADMIN — Medication 10 ML: at 09:31

## 2020-04-12 RX ADMIN — MELATONIN TAB 5 MG 5 MG: 5 TAB at 20:12

## 2020-04-12 RX ADMIN — CALCIUM CARBONATE (ANTACID) CHEW TAB 500 MG 1 TABLET: 500 CHEW TAB at 09:31

## 2020-04-12 RX ADMIN — TEMAZEPAM 15 MG: 15 CAPSULE ORAL at 20:12

## 2020-04-12 NOTE — PROGRESS NOTES
"   LOS: 4 days   Patient Care Team:  Otis Armstrong MD as PCP - General (Family Medicine)    Chief Complaint: Osteoporotic compression fractures    Subjective     Covering for Dr. Trent.  The TLSO brace arrived yesterday but it is fairly ill fitting so it has to be revised.  The plan is still nonoperative with bracing despite her fractures.  She seems to be reasonably comfortable at least in bed.  Moves legs well.  Is cold.      Subjective:  Symptoms:  Stable.    Diet:  Adequate intake.    Activity level: Impaired due to pain.    Pain:  She complains of pain that is moderate.  She reports pain is unchanged.  Pain is well controlled.        History taken from: patient chart RN    Objective     Vital Signs  Temp:  [97.5 °F (36.4 °C)-98.2 °F (36.8 °C)] 98 °F (36.7 °C)  Heart Rate:  [66-72] 72  Resp:  [16-18] 18  BP: (130-142)/(68-75) 142/68    Objective:  General Appearance:  Comfortable.    Vital signs: (most recent): Blood pressure 142/68, pulse 72, temperature 98 °F (36.7 °C), temperature source Oral, resp. rate 18, height 152.4 cm (60\"), weight 41.3 kg (91 lb 0.8 oz), SpO2 96 %.  Vital signs are normal.  No fever.    Output: Producing urine and producing stool.    HEENT: Normal HEENT exam.    Lungs:  Normal effort.    Heart: Normal rate.    Chest: Symmetric chest wall expansion.   Abdomen: Abdomen is soft.  Bowel sounds are normal.   There is no abdominal tenderness.   There is no mass.   Extremities: Decreased range of motion.    Pulses: Distal pulses are intact.    Neurological: Patient is alert and oriented to person, place and time.  Normal strength.  Patient has normal reflexes, normal muscle tone and normal coordination.    Pupils:  Pupils are equal, round, and reactive to light.    Skin:  Warm.              Results Review:     I reviewed the patient's new clinical results.  I reviewed the patient's new imaging results and agree with the interpretation.  I reviewed the patient's other test results " and agree with the interpretation  DATE OF EXAM:  4/10/2020 10:08 AM     PROCEDURE:  CT LUMBAR SPINE WO CONTRAST-      INDICATIONS:   compression fracture, history of breast cancer     COMPARISON:   CT thoracic spine from today     TECHNIQUE:  Routine transaxial slices were obtained through the lumbar spine without  the administration of intravenous contrast. Reconstructed coronal and  sagittal images were also obtained. Automated exposure control and  iterative construction methods were used.     FINDINGS:  In correlation with CT thoracic spine from today, there are 11  rib-bearing thoracic vertebral bodies. Thus, the first nonrib-bearing  vertebral body is designated T12, and there is sacralization of the L5  vertebral body. Thoracic compression deformities are better  characterized on CT thoracic spine from today. There is a moderate  compression fracture at L2 with up to 60% loss of height centrally with  associated sclerosis. The remainder of the lumbar vertebral bodies  appear normal in height. The alignment is anatomic. There are mild  discogenic changes at L2-3 and L4-5. The posterior paravertebral soft  tissues are unremarkable. Within the partially visualized abdomen are  multiple hepatic cysts.     L1-2: Mild bilateral facet arthropathy. No spinal canal stenosis. Mild  right neural foraminal stenosis.     L2-3: Mild disc bulge. Mild bilateral facet arthropathy. No spinal canal  stenosis. Moderate bilateral neural foraminal stenosis.     L3-4: Moderate disc bulge eccentric to the left. Mild bilateral facet  arthropathy with ligamentum flavum infolding. No spinal canal stenosis.  Mild right and moderate left neural foraminal stenosis.     L4-5: Moderate disc bulge. Mild right and moderate left facet  arthropathy. No spinal canal stenosis. Mild right and moderate to severe  left neural foraminal stenosis.     L5-S1: No spinal canal or neural foraminal stenosis.     IMPRESSION:  1.Moderate compression  fracture at L2 with associated sclerosis, which  could be subacute. Given history of breast cancer, a pathological  fracture cannot be excluded.  2.Multilevel degenerative changes of lumbar spine as described above.     Electronically Signed By-DR. Dano Menendez MD On:4/10/2020 10:58 AM  This report was finalized on [id number] by DR. Dano Menendez MD.    DATE OF EXAM:  4/10/2020 10:08 AM     PROCEDURE:  CT THORACIC SPINE WO CONTRAST-      INDICATIONS:   compression fracture, history of breast cancer in 2013     COMPARISON:   No Comparisons Available     TECHNIQUE:  Routine transaxial slices were obtained through the thoracic spine  without the administration of intravenous contrast. Reconstructed  coronal and sagittal images were also obtained. Automated exposure  control and iterative construction methods were used.     FINDINGS:  There is a severe compression fracture at T12 with bony retropulsion  resulting in mild spinal canal stenosis. There is a mild compression  fracture at T11 with minimal bony retropulsion without significant  spinal canal stenosis. There is a mild superior endplate compression  deformity at T3 that appears chronic. There is mild accentuation of the  thoracic kyphosis. There are mild discogenic changes in the upper and  mid thoracic spine. No high-grade spinal canal stenosis is identified.  No high-grade neural foraminal stenosis is identified. The posterior  paravertebral soft tissues are unremarkable. There is severe mottling of  the sternum and manubrium. Within the visualized lungs is emphysema and  multiple calcified granulomas. There is also a 5 mm right lower lobe  nodule on image 53 of series 3. There is also a small right pleural  effusion with right basilar atelectasis. Multiple old healed bilateral  rib fractures are noted.     IMPRESSION:  1.Severe compression fracture at T12, and mild compression fracture at  T11, which may be subacute. No prior imaging is available  for  comparison. Given history of breast cancer, pathological fractures  cannot be excluded.  2.Severe mottling of sternum and manubrium. Differential considerations  include postradiation changes, osseous metastasis, or sequela of prior  trauma.  3.Mild degenerative changes of thoracic spine as described above.  4.5 mm right lower lobe nodule. Recommend follow-up CT chest in 3-6  months.  5.Small right pleural effusion with right basilar atelectasis.     Electronically Signed By-DR. Dano Menendez MD On:4/10/2020 10:51 AM  This report was finalized on [id number] by DR. Dano Menendez MD.   Imaging     CT Thoracic Spine Without Contrast (Order: [id number]) - 4/10/2020   Lab Results   Component Value Date    GLUCOSE 81 04/09/2020    BUN 7 (L) 04/09/2020    CREATININE 0.47 (L) 04/09/2020    EGFRIFNONA 126 04/09/2020    BCR 14.9 04/09/2020    K 3.5 04/09/2020    CO2 28.0 04/09/2020    CALCIUM 8.6 04/09/2020    ALBUMIN 3.70 04/08/2020    AST 17 04/08/2020    ALT 10 04/08/2020     No results found for: WBC, RBC, HGB, HCT, MCV, MCH, MCHC, RDW, RDWSD, MPV, PLT, NEUTRORELPCT, LYMPHORELPCT, MONORELPCT, EOSRELPCT, BASORELPCT, AUTOIGPER, NEUTROABS, LYMPHSABS, MONOSABS, EOSABS, BASOSABS, AUTOIGNUM, NRBC        Medication Review:   Current Facility-Administered Medications:   •  ALPRAZolam (XANAX) tablet 0.25 mg, 0.25 mg, Oral, TID PRN, Margarita Dennis MD, 0.25 mg at 04/11/20 1504  •  aluminum-magnesium hydroxide-simethicone (MAALOX MAX) 400-400-40 MG/5ML suspension 15 mL, 15 mL, Oral, Q6H PRN, Sarina Esqueda PA-C  •  bisacodyl (DULCOLAX) suppository 10 mg, 10 mg, Rectal, Daily PRN, Sarina Esqueda PA-C  •  calcium carbonate (TUMS) chewable tablet 500 mg (200 mg elemental), 1 tablet, Oral, Daily, Margarita Dennis MD, 1 tablet at 04/12/20 0931  •  cholecalciferol (VITAMIN D3) tablet 1,000 Units, 1,000 Units, Oral, Daily, Margarita Dennis MD, 1,000 Units at 04/12/20 0931  •  lidocaine (LIDODERM) 5 % 1 patch, 1 patch, Transdermal,  Q24H, Margarita Dennis MD, 1 patch at 04/12/20 0931  •  magnesium hydroxide (MILK OF MAGNESIA) suspension 2400 mg/10mL 10 mL, 10 mL, Oral, Daily PRN, Sarina Esqueda PA-C  •  melatonin tablet 5 mg, 5 mg, Oral, Nightly PRN, Sarina Esqueda PA-C, 5 mg at 04/11/20 2044  •  OLANZapine (zyPREXA) tablet 2.5 mg, 2.5 mg, Oral, BID PRN, Margarita Dennis MD  •  ondansetron (ZOFRAN) tablet 4 mg, 4 mg, Oral, Q6H PRN **OR** ondansetron (ZOFRAN) injection 4 mg, 4 mg, Intravenous, Q6H PRN, Sarina Esqueda PA-C, 4 mg at 04/11/20 1051  •  sodium chloride 0.9 % flush 10 mL, 10 mL, Intravenous, Q12H, Sarina Esqueda PA-C, 10 mL at 04/12/20 0931  •  sodium chloride 0.9 % flush 10 mL, 10 mL, Intravenous, PRN, Sarina Esqueda PA-C  •  temazepam (RESTORIL) capsule 15 mg, 15 mg, Oral, Nightly PRN, Eliud Pratt APRN, 15 mg at 04/11/20 2044    Assessment/Plan       Compression fracture of T12 vertebra (CMS/HCC)    COPD (chronic obstructive pulmonary disease) (CMS/HCC)    Essential hypertension    GERD (gastroesophageal reflux disease)    Mixed anxiety and depressive disorder    Persistent insomnia    Weakness    Hypokalemia    Chronic diarrhea    Compression fracture of thoracic vertebra (CMS/HCC)      Assessment:    Condition: In stable condition.  Improving.   (Nonoperative treatment for her multiple osteoporotic compression fractures.  The TLSO brace needs to be fitted better.  Mobilize as tolerated.).     Plan:   (See above.  Dr. Trent back tomorrow.).       Ashish Alcantar MD  04/12/20  11:28    Time:

## 2020-04-12 NOTE — THERAPY TREATMENT NOTE
Patient Name: Carly Swift  : 1933    MRN: [id number]                              Today's Date: 2020       Admit Date: 2020    Visit Dx:     ICD-10-CM ICD-9-CM   1. Compression fracture of T12 vertebra, initial encounter (CMS/[facility]) S22.080A 805.2     Patient Active Problem List   Diagnosis   • Primary malignant neoplasm of breast (CMS/[facility])   • Abnormal weight loss   • Atrophic vaginitis   • COPD (chronic obstructive pulmonary disease) (CMS/[facility])   • DDD (degenerative disc disease)   • Essential hypertension   • Gait difficulty   • GERD (gastroesophageal reflux disease)   • Liver cyst   • Mixed anxiety and depressive disorder   • Osteoporosis   • Persistent insomnia   • Postcholecystectomy syndrome   • Tobacco dependence syndrome   • Weakness   • Compression fracture of T12 vertebra (CMS/[facility])   • Hypokalemia   • Chronic diarrhea   • Compression fracture of thoracic vertebra (CMS/[facility])     Past Medical History:   Diagnosis Date   • Chronic diarrhea 2020   • COPD (chronic obstructive pulmonary disease) (CMS/[facility]) 2014   • Essential hypertension 2020   • GERD (gastroesophageal reflux disease) 2014   • Mixed anxiety and depressive disorder 2020   • Persistent insomnia 2020     History reviewed. No pertinent surgical history.  General Information     Row Name 20 1547          PT Evaluation Time/Intention    Document Type  therapy note (daily note)  -HD     Mode of Treatment  physical therapy  -HD     Row Name 20 1547          General Information    Patient Profile Reviewed?  yes  -HD     Existing Precautions/Restrictions  brace worn when out of bed;spinal;fall  -HD     Row Name 20 1547          Cognitive Assessment/Intervention- PT/OT    Orientation Status (Cognition)  oriented x 3  -HD     Row Name 20 1547          Safety Issues, Functional Mobility    Safety Issues Affecting Function (Mobility)  insight into deficits/self awareness;safety precaution  awareness  -HD     Impairments Affecting Function (Mobility)  balance;postural/trunk control;endurance/activity tolerance;strength  -HD       User Key  (r) = Recorded By, (t) = Taken By, (c) = Cosigned By    Initials Name Provider Type    Odilia Walters, PT Physical Therapist        Mobility     Row Name 04/12/20 1547          Bed Mobility Assessment/Treatment    Bed Mobility Assessment/Treatment  supine-sit;sit-supine using logroll technique  -HD     Supine-Sit [place] (Bed Mobility)  moderate assist (50% patient effort)  -HD     Sit-Supine [place] (Bed Mobility)  moderate assist (50% patient effort)  -HD     Row Name 04/12/20 1547          Sit-Stand Transfer    Sit-Stand [place] (Transfers)  minimum assist (75% patient effort)  -HD     Assistive Device (Sit-Stand Transfers)  walker, front-wheeled  -HD     Row Name 04/12/20 1547          Gait/Stairs Assessment/Training    Gait/Stairs Assessment/Training  gait/ambulation independence;gait/ambulation assistive device  -HD     [place] Level (Gait)  minimum assist (75% patient effort)  -HD     Assistive Device (Gait)  walker, front-wheeled  -HD     Distance in Feet (Gait)  40 ft, 50 ft   -HD     Deviations/Abnormal Patterns (Gait)  gait speed decreased;stride length decreased  -HD     Bilateral Gait Deviations  heel strike decreased;forward flexed posture  -HD     Comment (Gait/Stairs)  forward flexed posture, increased reliance on RW for UE support, verbal cues for proper positioning within RW   -HD       User Key  (r) = Recorded By, (t) = Taken By, (c) = Cosigned By    Initials Name Provider Type    Odilia Walters, PT Physical Therapist        Obj/Interventions     Row Name 04/12/20 1549          Static Sitting Balance    Level of [place] (Unsupported Sitting, Static Balance)  standby assist  -HD     Sitting Position (Unsupported Sitting, Static Balance)  sitting on edge of bed  -HD     Time Able to Maintain Position (Unsupported  Sitting, Static Balance)  3 to 4 minutes  -HD     Comment (Unsupported Sitting, Static Balance)  TLSO brace donned while sitting EOB   -HD     Row Name 04/12/20 1549          Dynamic Sitting Balance    Level of [place], Reaches Outside Midline (Sitting, Dynamic Balance)  contact guard assist  -HD     Sitting Position, Reaches Outside Midline (Sitting, Dynamic Balance)  sitting on edge of bed  -HD     Row Name 04/12/20 1549          Static Standing Balance    Level of [place] (Supported Standing, Static Balance)  moderate assist, 50 to 74% patient effort  -HD     Time Able to Maintain Position (Supported Standing, Static Balance)  1 to 2 minutes  -HD     Assistive Device Utilized (Supported Standing, Static Balance)  walker, rolling  -HD     Row Name 04/12/20 1549          Dynamic Standing Balance    Level of [place], Reaches Outside Midline (Standing, Dynamic Balance)  minimal assist, 75% patient effort  -HD     Time Able to Maintain Position, Reaches Outside Midline (Standing, Dynamic Balance)  2 to 3 minutes  -HD     Assistive Device Utilized (Supported Standing, Dynamic Balance)  walker, rolling  -HD       User Key  (r) = Recorded By, (t) = Taken By, (c) = Cosigned By    Initials Name Provider Type    Odilia Walters, PT Physical Therapist        Goals/Plan     Row Name 04/12/20 1550          Bed Mobility Goal 1 (PT)    Progress/Outcomes (Bed Mobility Goal 1, PT)  continuing progress toward goal  -HD     Row Name 04/12/20 1550          Transfer Goal 1 (PT)    Progress/Outcome (Transfer Goal 1, PT)  continuing progress toward goal  -HD     Row Name 04/12/20 1550          Gait Training Goal 1 (PT)    Progress/Outcome (Gait Training Goal 1, PT)  continuing progress toward goal  -HD       User Key  (r) = Recorded By, (t) = Taken By, (c) = Cosigned By    Initials Name Provider Type    Odilia Walters, PT Physical Therapist        Clinical Impression     Row Name 04/12/20 1549          Pain  "Assessment    Additional Documentation  Pain Scale: FACES Pre/Post-Treatment (Group)  -HD     Row Name 04/12/20 1549          Pain Scale: FACES Pre/Post-Treatment    Pain: FACES Scale, Pretreatment  2-->hurts little bit  -HD     Pain: FACES Scale, Post-Treatment  2-->hurts little bit  -HD     Pre/Post Treatment Pain Comment  back discomfort, pt reports TLSO is \"heavy\"  -HD     Row Name 04/12/20 1549          Physical Therapy Clinical Impression    Rehab Potential (PT Clinical Summary)  good, to achieve stated therapy goals  -HD     Row Name 04/12/20 1549          Positioning and Restraints    Pre-Treatment Position  in bed  -HD     Post Treatment Position  bed  -HD     In Bed  notified nsg;encouraged to call for assist;call light within reach;exit alarm on  -HD       User Key  (r) = Recorded By, (t) = Taken By, (c) = Cosigned By    Initials Name Provider Type    Odilia Walters, PT Physical Therapist        Outcome Measures     Row Name 04/12/20 7530          How much help from another person do you currently need...    Turning from your back to your side while in flat bed without using bedrails?  2  -HD     Moving from lying on back to sitting on the side of a flat bed without bedrails?  2  -HD     Moving to and from a bed to a chair (including a wheelchair)?  3  -HD     Standing up from a chair using your arms (e.g., wheelchair, bedside chair)?  3  -HD     Climbing 3-5 steps with a railing?  1  -HD     To walk in hospital room?  3  -HD     AM-PAC 6 Clicks Score (PT)  14  -HD       User Key  (r) = Recorded By, (t) = Taken By, (c) = Cosigned By    Initials Name Provider Type    Odilia Walters, PT Physical Therapist          PT Recommendation and Plan     Outcome Summary/Treatment Plan (PT)  Anticipated Discharge Disposition (PT): inpatient rehabilitation facility  Plan of Care Reviewed With: patient  Outcome Summary: Pt presents with anxious feelings, however appears more calm at end of tx session.  Pt " requiring modA for supine <-> sit bed mobility using logroll technique.  TLSO adjusted and donned on pt while EOB.  Pt able to complete transfers with Estephanie and ambulate with Estephanie and use of RW.  Pt requiring verbal cues for proper positioning within RW to increase safety with ambulation.  Pt is far from functional mobility baseline and not safe to return home alone due to weakness and high risk for falls with mobility.  PT will continue to follow 5x/week while at [facility] and recommend IP rehab to address deficits.  PPE donned: gloves, mask.     Time Calculation:   PT Charges     Row Name 04/12/20 1553             Time Calculation    Start Time  1420  -HD      Stop Time  1440  -HD      Time Calculation (min)  20 min  -HD      PT Received On  04/12/20  -HD      PT - Next Appointment  04/13/20  -HD         Time Calculation- PT    Total Timed Code Minutes- PT  20 minute(s)  -HD        User Key  (r) = Recorded By, (t) = Taken By, (c) = Cosigned By    Initials Name Provider Type    HD Odilia Burden, PT Physical Therapist        Therapy Charges for Today     Code Description Service Date Service Provider Modifiers Qty    82547364884 HC GAIT TRAINING EA 15 MIN 4/12/2020 Odilia Burden, PT GP 1    02922171847 HC PT THERAPEUTIC ACT EA 15 MIN 4/12/2020 Odilia Burden, PT GP 1          PT G-Codes  Outcome Measure Options: AM-PAC 6 Clicks Basic Mobility (PT)  AM-PAC 6 Clicks Score (PT): 14    Odilia Burden PT  4/12/2020

## 2020-04-12 NOTE — PROGRESS NOTES
[facility] Medicine Services Daily Progress Note      Hospitalist Team  LOS 4 days      Patient Care Team:  Otis Armstrong MD as PCP - General (Family Medicine)    Patient Location: 360/1      Subjective   Subjective     Chief Complaint / Subjective  No chief complaint on file.        Brief Synopsis of Hospital Course/HPI   86 y.o. with   COPD, HTN, GERD, anxiety/depression/insomnia, DDD and h/o breast cancer who presents  from [facility] with complaints of chronic diarrhea, weight loss and generalized back pain.   a poor historian.   continued to lose weight with her chronic diarrhea over the last two months.  Reportedly, the patient was started on Lomotil and admits to improvement.    fell a couple of days ago, but isn't sure exactly how it happened.  She thinks she fell due to weakness.  The patient states she fell inside her house.  Over the last two days, she reports she was able to walk around and was with her daughter without concerns.  Today, she called her daughter and reported she was in severe pain.    She denies recent cough, shortness of air, fever, chills, travel or ill contacts.  She states she lives alone.             Date::  4/9- denies severe pain but states she just has sore in the back  4/10- states back pain when moving. MRI not completed yesterday, CT ordered per NS  4/11- today more clear in mentation. Denies back pain. Slept well last night after zyprexa  Waiting to PT eval. TLSO brace pending  4/12- brace not fitting, unable to do PT due to not proper brace yesterday. Denies pain. Appear very calm. Not required zyprexa last night. States no diarrhea today        ROS  Constitution: Negative for chills and fever.   Respiratory: Negative for cough and shortness of breath.    Musculoskeletal: Positive for back pain and falls.   Gastrointestinal: Positive for diarrhea. Negative for abdominal pain, nausea and vomiting.   Neurological: Positive for weakness.  "  All other systems reviewed and are negative.    Objective   Objective      Vital Signs  Temp:  [97.5 °F (36.4 °C)-98.2 °F (36.8 °C)] 98 °F (36.7 °C)  Heart Rate:  [66-72] 72  Resp:  [16-18] 18  BP: (130-142)/(68-75) 142/68  Oxygen Therapy  SpO2: 96 %  Pulse Oximetry Type: Intermittent  Device (Oxygen Therapy): room air  Flowsheet Rows      First Filed Value   Admission Height  152.4 cm (60\") Documented at 04/08/2020 2100   Admission Weight  42.1 kg (92 lb 13 oz) Documented at 04/08/2020 2100        Intake & Output (last 3 days)       04/09 0701 - 04/10 0700 04/10 0701 - 04/11 0700 04/11 0701 - 04/12 0700 04/12 0701 - 04/13 0700    P.O. 420 442 720     Total Intake(mL/kg) 420 (10) 442 (10.7) 720 (17.4)     Urine (mL/kg/hr) 1000 (1) 1250 (1.3) 100 (0.1) 250 (2)    Stool   0     Total Output 1000 1250 100 250    Net -580 -808 +620 -250            Stool Unmeasured Occurrence   4 x         Lines, Drains & Airways    Active LDAs     Name:   Placement date:   Placement time:   Site:   Days:    Peripheral IV 04/08/20 2230 Anterior;Left Forearm   04/08/20 2230    Forearm   less than 1                  Physical Exam:    Physical Exam    Constitutional: She is oriented to person, place, and time. No distress.   Cachectic  , forgettful  HENT:   Head: Normocephalic and atraumatic.   Eyes: Pupils are equal, round, and reactive to light. EOM are normal.   Neck: Normal range of motion. Neck supple.   Cardiovascular: Normal rate, regular rhythm and normal heart sounds. Exam reveals no gallop and no friction rub.   No murmur heard.  Pulmonary/Chest: Effort normal and breath sounds normal. No stridor. No respiratory distress. She has no wheezes.   Abdominal: Soft. Bowel sounds are normal. She exhibits no distension. There is no tenderness. There is no guarding.   Musculoskeletal: She exhibits no edema.   Decreased ROM d/t back pain    Neurological: She is alert and oriented to person, place, and time.   Skin: Skin is warm and " dry. She is not diaphoretic.   Psychiatric: She has a normal mood and affect.   Vitals reviewed.      Procedures:              Results Review:     I reviewed the patient's new clinical results.      Lab Results (last 24 hours)     ** No results found for the last 24 hours. **        No results found for: HGBA1C            No results found for: LIPASE  No results found for: CHOL, CHLPL, TRIG, HDL, LDL, LDLDIRECT    No results found for: INTRAOP, PREDX, FINALDX, COMDX    Microbiology Results (last 10 days)     ** No results found for the last 240 hours. **          ECG/EMG Results (most recent)     None                    Xr Spine Lumbar Complete With Flex & Ext    Result Date: 4/10/2020  1.Compression fractures at T11, T12, and L2 are unchanged from recent prior CT. 2.No evidence of spinal instability with flexion or extension. 3.Degenerative changes as described above.  Electronically Signed By-DR. Dano Menendez MD On:4/10/2020 12:23 PM This report was finalized on [id number] by DR. Dano Menendez MD.    Ct Thoracic Spine Without Contrast    Result Date: 4/10/2020  1.Severe compression fracture at T12, and mild compression fracture at T11, which may be subacute. No prior imaging is available for comparison. Given history of breast cancer, pathological fractures cannot be excluded. 2.Severe mottling of sternum and manubrium. Differential considerations include postradiation changes, osseous metastasis, or sequela of prior trauma. 3.Mild degenerative changes of thoracic spine as described above. 4.5 mm right lower lobe nodule. Recommend follow-up CT chest in 3-6 months. 5.Small right pleural effusion with right basilar atelectasis.  Electronically Signed By-DR. Dano Menendez MD On:4/10/2020 10:51 AM This report was finalized on [id number] by DR. Dano Menendez MD.    Ct Lumbar Spine Without Contrast    Result Date: 4/10/2020  1.Moderate compression fracture at L2 with associated sclerosis, which  could be subacute. Given history of breast cancer, a pathological fracture cannot be excluded. 2.Multilevel degenerative changes of lumbar spine as described above.  Electronically Signed By-DR. Dano Menendez MD On:4/10/2020 10:58 AM This report was finalized on [id number] by DR. Dano Menendez MD.          Xrays, labs reviewed personally by physician.    Medication Review:   I have reviewed the patient's current medication list      Scheduled Meds    calcium carbonate 1 tablet Oral Daily   cholecalciferol 1,000 Units Oral Daily   lidocaine 1 patch Transdermal Q24H   sodium chloride 10 mL Intravenous Q12H       Meds Infusions       Meds PRN  •  ALPRAZolam  •  aluminum-magnesium hydroxide-simethicone  •  bisacodyl  •  magnesium hydroxide  •  melatonin  •  OLANZapine  •  ondansetron **OR** ondansetron  •  sodium chloride  •  temazepam    I personally reviewed patient's x-ray films and my findings are same    I personally reviewed patient's EKG strips and my findings are same    Assessment/Plan   Assessment/Plan     Active Hospital Problems    Diagnosis  POA   • **Compression fracture of T12 vertebra (CMS/HCC) [S22.080A]  Yes   • Mixed anxiety and depressive disorder [F41.8]  Yes   • Hypokalemia [E87.6]  Yes   • Chronic diarrhea [K52.9]  Yes   • Compression fracture of thoracic vertebra (CMS/HCC) [S22.000A]  Yes   • Essential hypertension [I10]  Yes   • Persistent insomnia [G47.00]  Yes   • COPD (chronic obstructive pulmonary disease) (CMS/HCC) [J44.9]  Yes   • GERD (gastroesophageal reflux disease) [K21.9]  Yes   • Weakness [R53.1]  Yes      Resolved Hospital Problems   No resolved problems to display.       MEDICAL DECISION MAKING COMPLEXITY BY PROBLEM:   Recent falls    Compression fx, multiple, L2, T11, 12  -  L2 60% height loss in CT  - mild to mod T11, severe T12 with mild spinal stenosis -   in CT.  - CT L and T spine  - L3 end plate fx, chronic. Multiple healed rib fx. Mottling of sternum and  manubrium. 5mm RLL lung nodule. Small R pleural effsuion  - unable to do MRI- CT TL result as above  - neurosurgery - nonsurgical management since pain is controlled  - TLSO brace ordered- not fit- needs different one  - on Lidoderm patch  - tylenol tid for 2 days and prn  - PT consult- waiting for brace  - on vit D and calcium for osteoporosis         Panic anxiety  - xanax prn  - stable      Hypokalemia  - replaced      Chronic diarrhea  - lomotil prn      Dementia with delirium  - stable. zyprexa prn    HTN  COPD without exa  Anxiety and depression  GERD  Breast cancer with radiation            VTE Prophylaxis -   Mechanical Order History:      Ordered        04/08/20 2223  Place Sequential Compression Device  Once         04/08/20 2223  Maintain Sequential Compression Device  Continuous                 Pharmalogical Order History:     None            Code Status -   Code Status and Medical Interventions:   Ordered at: 04/08/20 2223     Level Of Support Discussed With:    Patient     Code Status:    No CPR     Medical Interventions (Level of Support Prior to Arrest):    Comfort Measures          Discharge Planning    Pending PT eval, TLSO brace pending  SLP OP Goals     Row Name 04/11/20 1616          Time Calculation    PT Goal Re-Cert Due Date  04/25/20  -CM       User Key  (r) = Recorded By, (t) = Taken By, (c) = Cosigned By    Initials Name Provider Type    Rocio Barton, PT Physical Therapist          Destination      Coordination has not been started for this encounter.      Durable Medical Equipment      Coordination has not been started for this encounter.      Dialysis/Infusion      Coordination has not been started for this encounter.      Home Medical Care      Coordination has not been started for this encounter.      Therapy      Coordination has not been started for this encounter.      Community Resources      Coordination has not been started for this encounter.            Electronically  signed by Margarita Thomas MD, 04/12/20, 10:02.  [facility] Hospitalist Team

## 2020-04-12 NOTE — PLAN OF CARE
Problem: Patient Care Overview  Goal: Plan of Care Review  Outcome: Ongoing (interventions implemented as appropriate)  Flowsheets (Taken 4/12/2020 0208)  Plan of Care Reviewed With: patient  Outcome Summary: patient resting well, patient waiting to get a different back brace due to current one not fitting, will continue to monitor

## 2020-04-12 NOTE — PLAN OF CARE
Problem: Patient Care Overview  Goal: Plan of Care Review  Outcome: Ongoing (interventions implemented as appropriate)  Flowsheets (Taken 4/12/2020 1604)  Progress: no change  Plan of Care Reviewed With: patient  Outcome Summary: patient resting in bed throughout shift. anxiety treated per MAR. no further complaints at this time. will continue to monitor.

## 2020-04-12 NOTE — PLAN OF CARE
Problem: Patient Care Overview  Goal: Plan of Care Review  Outcome: Ongoing (interventions implemented as appropriate)  Flowsheets (Taken 4/12/2020 7830)  Plan of Care Reviewed With: patient  Outcome Summary: Pt presents with anxious feelings, however appears more calm at end of tx session.  Pt requiring modA for supine <-> sit bed mobility using logroll technique.  TLSO adjusted and donned on pt while EOB.  Pt able to complete transfers with Estephanie and ambulate with Estephanie and use of RW.  Pt requiring verbal cues for proper positioning within RW to increase safety with ambulation.  Pt is far from functional mobility baseline and not safe to return home alone due to weakness and high risk for falls with mobility.  PT will continue to follow 5x/week while at [facility] and recommend IP rehab to address deficits.  PPE donned: gloves, mask.

## 2020-04-13 PROCEDURE — 99232 SBSQ HOSP IP/OBS MODERATE 35: CPT | Performed by: INTERNAL MEDICINE

## 2020-04-13 RX ADMIN — ALPRAZOLAM 0.25 MG: 0.25 TABLET ORAL at 13:44

## 2020-04-13 RX ADMIN — Medication 10 ML: at 20:24

## 2020-04-13 RX ADMIN — Medication 10 ML: at 09:34

## 2020-04-13 RX ADMIN — LIDOCAINE 1 PATCH: 50 PATCH TOPICAL at 09:32

## 2020-04-13 RX ADMIN — MELATONIN 1000 UNITS: at 09:32

## 2020-04-13 RX ADMIN — CALCIUM CARBONATE (ANTACID) CHEW TAB 500 MG 1 TABLET: 500 CHEW TAB at 09:32

## 2020-04-13 RX ADMIN — TEMAZEPAM 15 MG: 15 CAPSULE ORAL at 20:19

## 2020-04-13 RX ADMIN — ALPRAZOLAM 0.25 MG: 0.25 TABLET ORAL at 20:19

## 2020-04-13 RX ADMIN — MELATONIN TAB 5 MG 5 MG: 5 TAB at 20:20

## 2020-04-13 NOTE — PLAN OF CARE
Patient weak. Patient vitals currently stable. Will continue to monitor. Patient possibly to be discharged to rehab tomorrow.    Problem: Patient Care Overview  Goal: Plan of Care Review  Outcome: Ongoing (interventions implemented as appropriate)  Goal: Individualization and Mutuality  Outcome: Ongoing (interventions implemented as appropriate)  Goal: Discharge Needs Assessment  Outcome: Ongoing (interventions implemented as appropriate)  Goal: Interprofessional Rounds/Family Conf  Outcome: Ongoing (interventions implemented as appropriate)     Problem: Fall Risk (Adult)  Goal: Identify Related Risk Factors and Signs and Symptoms  Outcome: Ongoing (interventions implemented as appropriate)  Goal: Absence of Fall  Outcome: Ongoing (interventions implemented as appropriate)     Problem: Skin Injury Risk (Adult)  Goal: Identify Related Risk Factors and Signs and Symptoms  Outcome: Ongoing (interventions implemented as appropriate)  Goal: Skin Health and Integrity  Outcome: Ongoing (interventions implemented as appropriate)

## 2020-04-13 NOTE — PLAN OF CARE
Patient doing well.  Was advised on how to wear her brace.  She felt like the brace was heavy but I informed her this was the lightest brace.  She was placed back in the brace by me today and she states it does significantly help her back pain.  She is to wear the brace whenever up greater than 45 degrees for 15 minutes.  I will see her back in 1 month repeat standing films.

## 2020-04-13 NOTE — PROGRESS NOTES
Discharge Planning Assessment   Ibrahima     Patient Name: Carly Swift  MRN: [id number]  Today's Date: 4/13/2020    Admit Date: 4/8/2020          Plan    Plan  referral to [facility] for rehab    Patient/Family in Agreement with Plan  other (see comments) per alex preston she talked to family and they would like a referral to [facility] Carol naegele rn  Case management  Office number [phone]  Cell phone [phone]

## 2020-04-13 NOTE — PROGRESS NOTES
[facility] Medicine Services Daily Progress Note      Hospitalist Team  LOS 5 days      Patient Care Team:  Otis Armstrong MD as PCP - General (Family Medicine)    Patient Location: 360/1      Subjective   Subjective     Chief Complaint / Subjective  No chief complaint on file.        Brief Synopsis of Hospital Course/HPI   86 y.o. with   COPD, HTN, GERD, anxiety/depression/insomnia, DDD and h/o breast cancer who presents  from [facility] with complaints of chronic diarrhea, weight loss and generalized back pain.   a poor historian.   continued to lose weight with her chronic diarrhea over the last two months.  Reportedly, the patient was started on Lomotil and admits to improvement.    fell a couple of days ago, but isn't sure exactly how it happened.  She thinks she fell due to weakness.  The patient states she fell inside her house.  Over the last two days, she reports she was able to walk around and was with her daughter without concerns.  Today, she called her daughter and reported she was in severe pain.    She denies recent cough, shortness of air, fever, chills, travel or ill contacts.  She states she lives alone.             Date::  4/9- denies severe pain but states she just has sore in the back  4/10- states back pain when moving. MRI not completed yesterday, CT ordered per NS  4/11- today more clear in mentation. Denies back pain. Slept well last night after zyprexa  Waiting to PT eval. TLSO brace pending  4/12- brace not fitting, unable to do PT due to not proper brace yesterday. Denies pain. Appear very calm. Not required zyprexa last night. States no diarrhea today  4/13- sitting on chair with brace. Pending rehab. Very tired but no pain per pt        ROS  Constitution: Negative for chills and fever.   Respiratory: Negative for cough and shortness of breath.    Musculoskeletal: Positive for back pain and falls.   Gastrointestinal: Positive for diarrhea.  "Negative for abdominal pain, nausea and vomiting.   Neurological: Positive for weakness.   All other systems reviewed and are negative.    Objective   Objective      Vital Signs  Temp:  [98.4 °F (36.9 °C)-98.9 °F (37.2 °C)] 98.9 °F (37.2 °C)  Heart Rate:  [63-67] 63  Resp:  [15-18] 15  BP: (146-162)/(66-80) 146/67  Oxygen Therapy  SpO2: 98 %  Pulse Oximetry Type: Intermittent  Device (Oxygen Therapy): nasal cannula  Flow (L/min): 2  Flowsheet Rows      First Filed Value   Admission Height  152.4 cm (60\") Documented at 04/08/2020 2100   Admission Weight  42.1 kg (92 lb 13 oz) Documented at 04/08/2020 2100        Intake & Output (last 3 days)       04/10 0701 - 04/11 0700 04/11 0701 - 04/12 0700 04/12 0701 - 04/13 0700 04/13 0701 - 04/14 0700    P.O. 442 720 540 780    Total Intake(mL/kg) 442 (10.7) 720 (17.4) 540 (13.1) 780 (18.9)    Urine (mL/kg/hr) 1250 (1.3) 100 (0.1) 1100 (1.1) 400 (1.1)    Stool  0 0 0    Total Output 5975 414 4851 400    Net -808 +620 -560 +380            Urine Unmeasured Occurrence   3 x     Stool Unmeasured Occurrence  4 x 2 x 3 x        Lines, Drains & Airways    Active LDAs     Name:   Placement date:   Placement time:   Site:   Days:    Peripheral IV 04/08/20 2230 Anterior;Left Forearm   04/08/20 2230    Forearm   less than 1                  Physical Exam:    Physical Exam    Constitutional: She is oriented to person, place, and time. No distress.   Cachectic  , forgettful  HENT:   Head: Normocephalic and atraumatic.   Eyes: Pupils are equal, round, and reactive to light. EOM are normal.   Neck: Normal range of motion. Neck supple.   Cardiovascular: Normal rate, regular rhythm and normal heart sounds. Exam reveals no gallop and no friction rub.   No murmur heard.  Pulmonary/Chest: Effort normal and breath sounds normal. No stridor. No respiratory distress. She has no wheezes.   Abdominal: Soft. Bowel sounds are normal. She exhibits no distension. There is no tenderness. There is no " guarding.   Musculoskeletal: She exhibits no edema.   Decreased ROM d/t back pain    Neurological: She is alert and oriented to person, place, and time.   Skin: Skin is warm and dry. She is not diaphoretic.   Psychiatric: She has a normal mood and affect.   Vitals reviewed.      Procedures:              Results Review:     I reviewed the patient's new clinical results.      Lab Results (last 24 hours)     ** No results found for the last 24 hours. **        No results found for: HGBA1C            No results found for: LIPASE  No results found for: CHOL, CHLPL, TRIG, HDL, LDL, LDLDIRECT    No results found for: INTRAOP, PREDX, FINALDX, COMDX    Microbiology Results (last 10 days)     ** No results found for the last 240 hours. **          ECG/EMG Results (most recent)     None                    Xr Spine Lumbar Complete With Flex & Ext    Result Date: 4/10/2020  1.Compression fractures at T11, T12, and L2 are unchanged from recent prior CT. 2.No evidence of spinal instability with flexion or extension. 3.Degenerative changes as described above.  Electronically Signed By-DR. Dano Menendez MD On:4/10/2020 12:23 PM This report was finalized on [id number] by DR. Dano Menendez MD.    Ct Thoracic Spine Without Contrast    Result Date: 4/10/2020  1.Severe compression fracture at T12, and mild compression fracture at T11, which may be subacute. No prior imaging is available for comparison. Given history of breast cancer, pathological fractures cannot be excluded. 2.Severe mottling of sternum and manubrium. Differential considerations include postradiation changes, osseous metastasis, or sequela of prior trauma. 3.Mild degenerative changes of thoracic spine as described above. 4.5 mm right lower lobe nodule. Recommend follow-up CT chest in 3-6 months. 5.Small right pleural effusion with right basilar atelectasis.  Electronically Signed By-DR. Dano Menendez MD On:4/10/2020 10:51 AM This report was finalized on  [id number] by DR. Dano Menendez MD.    Ct Lumbar Spine Without Contrast    Result Date: 4/10/2020  1.Moderate compression fracture at L2 with associated sclerosis, which could be subacute. Given history of breast cancer, a pathological fracture cannot be excluded. 2.Multilevel degenerative changes of lumbar spine as described above.  Electronically Signed By-DR. Dano Menendez MD On:4/10/2020 10:58 AM This report was finalized on [id number] by DR. Dano Menendez MD.          Xrays, labs reviewed personally by physician.    Medication Review:   I have reviewed the patient's current medication list      Scheduled Meds    calcium carbonate 1 tablet Oral Daily   cholecalciferol 1,000 Units Oral Daily   lidocaine 1 patch Transdermal Q24H   sodium chloride 10 mL Intravenous Q12H       Meds Infusions       Meds PRN  •  ALPRAZolam  •  aluminum-magnesium hydroxide-simethicone  •  bisacodyl  •  magnesium hydroxide  •  melatonin  •  OLANZapine  •  ondansetron **OR** ondansetron  •  sodium chloride  •  temazepam    I personally reviewed patient's x-ray films and my findings are same    I personally reviewed patient's EKG strips and my findings are same    Assessment/Plan   Assessment/Plan     Active Hospital Problems    Diagnosis  POA   • **Compression fracture of T12 vertebra (CMS/HCC) [S22.080A]  Yes   • Mixed anxiety and depressive disorder [F41.8]  Yes   • Hypokalemia [E87.6]  Yes   • Chronic diarrhea [K52.9]  Yes   • Compression fracture of thoracic vertebra (CMS/HCC) [S22.000A]  Yes   • Essential hypertension [I10]  Yes   • Persistent insomnia [G47.00]  Yes   • COPD (chronic obstructive pulmonary disease) (CMS/HCC) [J44.9]  Yes   • GERD (gastroesophageal reflux disease) [K21.9]  Yes   • Weakness [R53.1]  Yes      Resolved Hospital Problems   No resolved problems to display.       MEDICAL DECISION MAKING COMPLEXITY BY PROBLEM:   Recent falls    Compression fx, multiple, L2, T11, 12  -  L2 60% height loss in  CT  - mild to mod T11, severe T12 with mild spinal stenosis -   in CT.  - CT L and T spine  - L3 end plate fx, chronic. Multiple healed rib fx. Mottling of sternum and manubrium. 5mm RLL lung nodule. Small R pleural effsuion  - unable to do MRI- CT TL result as above  - neurosurgery - nonsurgical management since pain is controlled  - TLSO brace ordered- not fit- needs different one  - on Lidoderm patch  - tylenol tid for 2 days and prn  - PT consult- on brace- recommend inpatient rehab  - on vit D and calcium for osteoporosis         Panic anxiety  - xanax prn  - stable      Hypokalemia  - replaced      Chronic diarrhea  - lomotil prn      Dementia with delirium  - stable. zyprexa prn    HTN  COPD without exa  Anxiety and depression  GERD  Breast cancer with radiation            VTE Prophylaxis -   Mechanical Order History:      Ordered        04/08/20 2223  Place Sequential Compression Device  Once         04/08/20 2223  Maintain Sequential Compression Device  Continuous                 Pharmalogical Order History:     None            Code Status -   Code Status and Medical Interventions:   Ordered at: 04/08/20 2223     Level Of Support Discussed With:    Patient     Code Status:    No CPR     Medical Interventions (Level of Support Prior to Arrest):    Comfort Measures          Discharge Planning    Rehab placement pending      Destination      Coordination has not been started for this encounter.      Durable Medical Equipment      Coordination has not been started for this encounter.      Dialysis/Infusion      Coordination has not been started for this encounter.      Home Medical Care      Coordination has not been started for this encounter.      Therapy      Coordination has not been started for this encounter.      Community Resources      Coordination has not been started for this encounter.            Electronically signed by Margarita Thomas MD, 04/13/20, 15:30.  [facility] Hospitalist Team

## 2020-04-13 NOTE — PROGRESS NOTES
Nutrition Services    Patient Name:  Carly Swift  YOB: 1933  MRN: [id number]  Admit Date:  4/8/2020    Comments:   Nutrition interventions: Regular diet with Boost Plus BID to provide an additional 720 kcal and 28g Protein if consumed.      Reason for Assessment                Reason for Assessment    Reason For Assessment 4/13: Follow up protocol    4/9/20: Chronic poor intake consult, Low BMI, MST 3       Diagnosis H&P:   86 y.o. with   COPD, HTN, GERD, anxiety/depression/insomnia, DDD and h/o breast cancer who presents  from [facility] with complaints of chronic diarrhea, weight loss and generalized back pain.   a poor historian.   continued to lose weight with her chronic diarrhea over the last two months.  Reportedly, the patient was started on Lomotil and admits to improvement.    fell a couple of days ago, but isn't sure exactly how it happened.  She thinks she fell due to weakness.    Current Problems:   Compression fx  - non-surgical   - brace    Hypokalemia  - replaced    Chronic diarrhea  - Lomotil    HTN    COPD    GERD    Breast cancer         Nutrition/Diet History                Nutrition/Diet History    Narrative     4/13: Attempted to call into patient's room without answer. Secure message with pt's RN, Michelle, who reports pt is eating fairly well, no report of diarrhea from nightshift, and she is unsure if pt is drinking her Boost. RN states she will offer the supplement.    4/9: Attempted to contact pt's RN and patient via telephone without success. Chart reviewed. Unable to visit patient related to COVID-19 outbreak with limited in person contact.     Functional Status Independent PTA with help from daughter as needed.       Food Allergies NKFA     Factors Affecting Nutritional Intake Chronic diarrhea, acute illness         Anthropometrics             Anthropometrics    Height 152.4cm, 60in    Weight 41.3kg, 91lb  (4/12/20)  42.1kg, 92lb (4/8/20)       Admit Weight     Admit Weight 92lb       Ideal Body Weight (IBW)    Ideal Body Weight (IBW) 100lb    % Ideal Body Weight 91%       Usual Body Weight (UBW)    Usual Body Weight SHAKA    % Usual Body Weight SHAKA    Weight Hx  No weight hx available PTA       Body Mass Index (BMI)    BMI (kg/m2) 17.78           Labs/Medications                Labs    Pertinent Lab Results Comments Labs reviewed        Medications    Pertinent Medications Comments Vit D3, Zofran         Physical Findings                Physical Findings    Overall Physical Appearance 4/9: Unable to assess in person related to limited in person contact with COVID-19 outbreak. Secure message with pt's RN.     Edema  None noted per EMR     Gastrointestinal Last BM 4/12     Tubes none     Oral/Mouth Cavity No issues reported     Skin No breakdown noted         Estimated/Assessed Needs              Calorie Requirements     Height Used for Calorie Calculations       Weight Used for Calorie Calculations      Formula chosen for Calorie Calculations       Estimated Calorie Requirements (kcals/day)              Protein Requirements    Weight Used For Protein Calculations       Est Protein Requirement Amount (gms/kg)       Estimated Protein Requirements (gms/day)          Fluid Requirements     Estimated Fluid Requirements (mL/day)            Fluid Deficit       Desired Sodium Level (mEq/L)      Desired Sodium Level (mEq/L)      Estimated Fluid Deficit Needs (L)           Nutrition Prescription Ordered                Nutrition Prescription PO    Current PO Diet  Regular     Supplement Boost Plus BID        Nutrition Prescription EN    Enteral Route -     TF modular -     TF Delivery Method -     Current Ordered TF  -     Current Ordered Water flush  -     TF Observation  -        Nutrition Prescription TPN    TPN Route -     Current Ordered TPN Volume  -     Dextrose (gm/kcals)  -     Amino Acid (gm/kcals) -     Lipids (ML/Concentration/Frequency)  -     TPN Observation  -           Evaluation of Received Nutrient/Fluid Intake                PO Evaluation    % PO Intake 71% x 7 meals        EN Evaluation    TF Changes -     TF Residual -     TF Tolerance      Average EN Delivered  -        TPN Evaluation    Total Number of Days on TPN  -           Clinical Course      Clinical Nutrition Course Details  4/8-present: Regular         Problem/Interventions:                     Nutrition Diagnoses Problem 1      Problem 1   Underweight related to likely inadequate calorie intake PTA as evidenced by BMI 17.78.     Nutrition Diagnoses Problem 2      Problem 2            Intervention Goal                Intervention Goal    General Meal intake greater than 50%.    Consumption of one ONS daily.         Nutrition Intervention                Nutrition Intervention    RD/Tech Action Continue Boost Plus BID         Nutrition Prescription                Nutrition Prescription PO      Diet  Regular     Supplement Boost Plus BID        Nutrition Prescription EN    Enteral Prescription -        Nutrition Prescription TPN    TPN Prescription -         Monitor/Evaluation                Monitor/Evaluation    Monitor Intake, weight, labs, BM, skin             Electronically signed by:  Ofelia Mahajan RD  04/13/20 10:00

## 2020-04-13 NOTE — PLAN OF CARE
Pt rested well this shift. Medicated for sleep per MAR. Complains of anxiety. Up with assist of 1 to [facility]. Will continue to monitor.  Problem: Patient Care Overview  Goal: Plan of Care Review  Outcome: Ongoing (interventions implemented as appropriate)  Flowsheets (Taken 4/13/2020 0511)  Progress: improving  Plan of Care Reviewed With: patient  Goal: Individualization and Mutuality  Outcome: Ongoing (interventions implemented as appropriate)  Goal: Discharge Needs Assessment  Outcome: Ongoing (interventions implemented as appropriate)  Goal: Interprofessional Rounds/Family Conf  Outcome: Ongoing (interventions implemented as appropriate)     Problem: Fall Risk (Adult)  Goal: Identify Related Risk Factors and Signs and Symptoms  Outcome: Ongoing (interventions implemented as appropriate)  Goal: Absence of Fall  Outcome: Ongoing (interventions implemented as appropriate)     Problem: Skin Injury Risk (Adult)  Goal: Identify Related Risk Factors and Signs and Symptoms  Outcome: Ongoing (interventions implemented as appropriate)  Goal: Skin Health and Integrity  Outcome: Ongoing (interventions implemented as appropriate)

## 2020-04-13 NOTE — PROGRESS NOTES
Case Management/Social Work    Patient Name:  Carly Swift  YOB: 1933  MRN: [id number]  Admit Date:  4/8/2020      Sw spoke to pt's dtr/Tiesha and she requested referral to [facility],pt accepted,can admit on 4/14,no preceert needed,PASRR to be completed by sw on 4/14.Sw   informed pt's dtr by phone of acceptance.    Electronically signed by:  Veronica Holt  04/13/20 14:45   [phone]

## 2020-04-13 NOTE — DISCHARGE PLACEMENT REQUEST
"Ivette Martins (86 y.o. Female)     Date of Birth Social Security Number Address Home Phone MRN    04/25/1933  [facility] [address] [place] [id number] [phone] [id number]    [employer] Marital Status          None        Admission Date Admission Type Admitting Provider Attending Provider Department, Room/Bed    4/8/20 Urgent Margarita Dennis MD Seo, Mi La, MD [facility] 3C MEDICAL INPATIENT, 360/1    Discharge Date Discharge Disposition Discharge Destination                       Attending Provider:  Margarita Dennis MD    Allergies:  No Known Allergies    Isolation:  None   Infection:  None   Code Status:  No CPR    Ht:  152.4 cm (60\")   Wt:  41.3 kg (91 lb 0.8 oz)    Admission Cmt:  None   Principal Problem:  Compression fracture of T12 vertebra (CMS/[facility]) [S22.080A]                 Active Insurance as of 4/8/2020     Primary Coverage     Payor Plan Insurance Group Employer/Plan Group    MEDICARE MEDICARE A & B      Payor Plan Address Payor Plan Phone Number Payor Plan Fax Number Effective Dates    [address] [phone]  4/1/1998 - None Entered    [facility] [id number]       Subscriber Name Subscriber Birth Date Member ID       IVETTE MARTINS 4/25/1933 [id number]           Secondary Coverage     Payor Plan Insurance Group Employer/Plan Group    [place]      Payor Plan Address Payor Plan Phone Number Payor Plan Fax Number Effective Dates    [address]   1/1/2020 - None Entered    [place] [address]       Subscriber Name Subscriber Birth Date Member ID       IVETTE MARTINS 4/25/1933 [id number]                 Emergency Contacts      (Rel.) Home Phone Work Phone Mobile Phone    SHAWNEE PERALES (Daughter) [phone] -- [phone]              "

## 2020-04-14 VITALS
BODY MASS INDEX: 18.57 KG/M2 | SYSTOLIC BLOOD PRESSURE: 145 MMHG | WEIGHT: 94.58 LBS | HEART RATE: 62 BPM | OXYGEN SATURATION: 96 % | TEMPERATURE: 97.7 F | HEIGHT: 60 IN | DIASTOLIC BLOOD PRESSURE: 76 MMHG | RESPIRATION RATE: 18 BRPM

## 2020-04-14 PROCEDURE — 97530 THERAPEUTIC ACTIVITIES: CPT

## 2020-04-14 PROCEDURE — 99239 HOSP IP/OBS DSCHRG MGMT >30: CPT | Performed by: INTERNAL MEDICINE

## 2020-04-14 PROCEDURE — 97116 GAIT TRAINING THERAPY: CPT

## 2020-04-14 RX ORDER — PANTOPRAZOLE SODIUM 40 MG/1
40 TABLET, DELAYED RELEASE ORAL
Status: DISCONTINUED | OUTPATIENT
Start: 2020-04-14 | End: 2020-04-14 | Stop reason: HOSPADM

## 2020-04-14 RX ORDER — MELATONIN
1000 DAILY
Qty: 30 TABLET | Refills: 0 | Status: SHIPPED | OUTPATIENT
Start: 2020-04-15

## 2020-04-14 RX ORDER — ACETAMINOPHEN 325 MG/1
650 TABLET ORAL EVERY 6 HOURS PRN
Start: 2020-04-14

## 2020-04-14 RX ORDER — LIDOCAINE 50 MG/G
1 PATCH TOPICAL
Qty: 30 PATCH | Refills: 0 | Status: SHIPPED | OUTPATIENT
Start: 2020-04-15

## 2020-04-14 RX ORDER — ALPRAZOLAM 0.25 MG/1
0.25 TABLET ORAL 2 TIMES DAILY
Qty: 14 TABLET | Refills: 0 | Status: SHIPPED | OUTPATIENT
Start: 2020-04-14 | End: 2020-04-21

## 2020-04-14 RX ORDER — PANTOPRAZOLE SODIUM 40 MG/1
40 TABLET, DELAYED RELEASE ORAL DAILY
Qty: 30 TABLET | Refills: 0 | Status: SHIPPED | OUTPATIENT
Start: 2020-04-14

## 2020-04-14 RX ORDER — TEMAZEPAM 15 MG/1
15 CAPSULE ORAL NIGHTLY PRN
Qty: 5 CAPSULE | Refills: 0 | Status: SHIPPED | OUTPATIENT
Start: 2020-04-14

## 2020-04-14 RX ORDER — OLANZAPINE 2.5 MG/1
2.5 TABLET ORAL NIGHTLY
Qty: 15 TABLET | Refills: 0 | Status: SHIPPED | OUTPATIENT
Start: 2020-04-14

## 2020-04-14 RX ORDER — CALCIUM CARBONATE 200(500)MG
1 TABLET,CHEWABLE ORAL DAILY
Qty: 30 TABLET | Refills: 0 | Status: SHIPPED | OUTPATIENT
Start: 2020-04-15

## 2020-04-14 RX ADMIN — PANTOPRAZOLE SODIUM 40 MG: 40 TABLET, DELAYED RELEASE ORAL at 08:59

## 2020-04-14 RX ADMIN — ALPRAZOLAM 0.25 MG: 0.25 TABLET ORAL at 10:02

## 2020-04-14 RX ADMIN — MELATONIN 1000 UNITS: at 09:00

## 2020-04-14 RX ADMIN — CALCIUM CARBONATE (ANTACID) CHEW TAB 500 MG 1 TABLET: 500 CHEW TAB at 09:00

## 2020-04-14 RX ADMIN — Medication 10 ML: at 09:00

## 2020-04-14 RX ADMIN — LIDOCAINE 1 PATCH: 50 PATCH TOPICAL at 09:00

## 2020-04-14 NOTE — THERAPY TREATMENT NOTE
Patient Name: Carly Swift  : 1933    MRN: [id number]                              Today's Date: 2020       Admit Date: 2020    Visit Dx:     ICD-10-CM ICD-9-CM   1. Compression fracture of T12 vertebra, initial encounter (CMS/[facility]) S22.080A 805.2   2. Mixed anxiety and depressive disorder F41.8 300.4   3. Persistent insomnia G47.00 307.42     Patient Active Problem List   Diagnosis   • Primary malignant neoplasm of breast (CMS/[facility])   • Abnormal weight loss   • Atrophic vaginitis   • COPD (chronic obstructive pulmonary disease) (CMS/[facility])   • DDD (degenerative disc disease)   • Essential hypertension   • Gait difficulty   • GERD (gastroesophageal reflux disease)   • Liver cyst   • Mixed anxiety and depressive disorder   • Osteoporosis   • Persistent insomnia   • Postcholecystectomy syndrome   • Tobacco dependence syndrome   • Weakness   • Compression fracture of T12 vertebra (CMS/[facility])   • Hypokalemia   • Chronic diarrhea   • Compression fracture of thoracic vertebra (CMS/[facility])     Past Medical History:   Diagnosis Date   • Chronic diarrhea 2020   • COPD (chronic obstructive pulmonary disease) (CMS/[facility]) 2014   • Essential hypertension 2020   • GERD (gastroesophageal reflux disease) 2014   • Mixed anxiety and depressive disorder 2020   • Persistent insomnia 2020     History reviewed. No pertinent surgical history.  General Information     Row Name 20 1204          PT Evaluation Time/Intention    Document Type  therapy note (daily note)  -SC     Mode of Treatment  physical therapy;individual therapy  -SC     Row Name 20 1204          General Information    Existing Precautions/Restrictions  brace worn when out of bed;spinal;fall  -SC     Row Name 20 1204          Cognitive Assessment/Intervention- PT/OT    Orientation Status (Cognition)  oriented x 3  -SC     Cognitive Assessment/Intervention Comment  pt pleasant and agreeable.  pt reported she was not quite  sure what the plans are  -SC     Row Name 04/14/20 1204          Safety Issues, Functional Mobility    Safety Issues Affecting Function (Mobility)  insight into deficits/self awareness;safety precaution awareness;problem solving  -SC     Impairments Affecting Function (Mobility)  balance;postural/trunk control;endurance/activity tolerance;strength  -SC       User Key  (r) = Recorded By, (t) = Taken By, (c) = Cosigned By    Initials Name Provider Type    SC Lamar Bailey PTA Physical Therapy Assistant        Mobility     Row Name 04/14/20 1205          Bed Mobility Assessment/Treatment    Bed Mobility Assessment/Treatment  supine-sit  -SC     Rolling Left Park (Bed Mobility)  minimum assist (75% patient effort);verbal cues  -SC     Rolling Right Park (Bed Mobility)  minimum assist (75% patient effort);verbal cues  -SC     Supine-Sit Park (Bed Mobility)  minimum assist (75% patient effort);1 person assist  -SC     Sidelying-Sit-Sidelying Park (Bed Mobility)  minimum assist (75% patient effort);1 person to manage equipment  -SC     Comment (Bed Mobility)  log rolling  -SC     Row Name 04/14/20 1205          Transfer Assessment/Treatment    Comment (Transfers)  attempted to don brace in supine but brace too bulky.  donned in sitting.  seems to be a better fit today.  sternal attachment was padded d/t  attachment pushing into pt's neck.    -SC     Row Name 04/14/20 1205          Sit-Stand Transfer    Sit-Stand Park (Transfers)  minimum assist (75% patient effort)  -SC     Assistive Device (Sit-Stand Transfers)  walker, front-wheeled  -SC     Row Name 04/14/20 1205          Gait/Stairs Assessment/Training    Gait/Stairs Assessment/Training  gait/ambulation independence;gait/ambulation assistive device  -SC     Park Level (Gait)  minimum assist (75% patient effort)  -SC     Assistive Device (Gait)  walker, front-wheeled  -SC     Distance in Feet (Gait)  15'  -SC      Pattern (Gait)  step-to  -SC     Bilateral Gait Deviations  forward flexed posture;heel strike decreased  -SC     Comment (Gait/Stairs)  pt able to correct posture but after about 10' pt returns pt to flexing forward.  cues to stay in roll walker to facilitate improved posture  -SC       User Key  (r) = Recorded By, (t) = Taken By, (c) = Cosigned By    Initials Name Provider Type    Lamar Wilson, CARINA Physical Therapy Assistant        Obj/Interventions     Row Name 04/14/20 1213          Static Sitting Balance    Level of Lee (Unsupported Sitting, Static Balance)  standby assist  -SC     Sitting Position (Unsupported Sitting, Static Balance)  sitting on edge of bed  -SC     Time Able to Maintain Position (Unsupported Sitting, Static Balance)  3 to 4 minutes  -SC     Comment (Unsupported Sitting, Static Balance)  TLSO brace donned while sitting  -[facility] Name 04/14/20 1213          Dynamic Sitting Balance    Level of Lee, Reaches Outside Midline (Sitting, Dynamic Balance)  contact guard assist  -SC     Sitting Position, Reaches Outside Midline (Sitting, Dynamic Balance)  sitting on edge of bed  -[facility] Name 04/14/20 1213          Static Standing Balance    Level of Lee (Supported Standing, Static Balance)  minimal assist, 75% patient effort  -SC     Assistive Device Utilized (Supported Standing, Static Balance)  walker, rolling  -[facility] Name 04/14/20 1213          Dynamic Standing Balance    Level of Lee, Reaches Outside Midline (Standing, Dynamic Balance)  minimal assist, 75% patient effort  -SC     Assistive Device Utilized (Supported Standing, Dynamic Balance)  walker, rolling  -SC       User Key  (r) = Recorded By, (t) = Taken By, (c) = Cosigned By    Initials Name Provider Type    Lamar Wilson PTA Physical Therapy Assistant        Goals/Plan    No documentation.       Clinical Impression     Row Name 04/14/20 1214          Pain Assessment     Additional Documentation  Pain Scale: Numbers Pre/Post-Treatment (Group)  -[facility] Name 04/14/20 1214          Pain Scale: Numbers Pre/Post-Treatment    Pain Scale: Numbers, Pretreatment  0/10 - no pain  -SC     Pain Scale: Numbers, Post-Treatment  3/10  -SC     Pain Location  back  -SC     Pain Intervention(s)  Medication (See MAR);Rest  -[facility] Name 04/14/20 1214          Physical Therapy Clinical Impression    Rehab Potential (PT Clinical Summary)  good, to achieve stated therapy goals  -[facility] Name 04/14/20 1214          Vital Signs    O2 Delivery Pre Treatment  room air  -SC     O2 Delivery Intra Treatment  room air  -SC     O2 Delivery Post Treatment  room air  -[facility] Name 04/14/20 1214          Positioning and Restraints    Pre-Treatment Position  in bed  -SC     Post Treatment Position  chair  -SC     In Bed  notified nsg;exit alarm on;sitting;call light within reach  -SC       User Key  (r) = Recorded By, (t) = Taken By, (c) = Cosigned By    Initials Name Provider Type    Lamar Wilson PTA Physical Therapy Assistant        Outcome Measures     [facility] Name 04/14/20 1221          How much help from another person do you currently need...    Turning from your back to your side while in flat bed without using bedrails?  3  -SC     Moving from lying on back to sitting on the side of a flat bed without bedrails?  3  -SC     Moving to and from a bed to a chair (including a wheelchair)?  3  -SC     Standing up from a chair using your arms (e.g., wheelchair, bedside chair)?  3  -SC     Climbing 3-5 steps with a railing?  2  -SC     To walk in hospital room?  3  -SC     AM-PAC 6 Clicks Score (PT)  17  -SC     Row Name 04/14/20 1221          Functional Assessment    Outcome Measure Options  AM-PAC 6 Clicks Basic Mobility (PT)  -SC       User Key  (r) = Recorded By, (t) = Taken By, (c) = Cosigned By    Initials Name Provider Type    Lamar Wilson PTA Physical Therapy Assistant          PT  Recommendation and Plan     Outcome Summary/Treatment Plan (PT)  Anticipated Discharge Disposition (PT): inpatient rehabilitation facility  Plan of Care Reviewed With: patient  Progress: improving  Outcome Summary: pt was able to roll in the bed, log roll to eob and progress to gait training with minimal assist today.  pt's back brace fit better today except for the sternal extension which causes some discomfort.  pt motivated and has good potential to cont to improve with physical therapy services.  Recommend IP rehab at d/c to allow pt to be able to recover her mobility.  PPE used, mask and gloves     Time Calculation:   PT Charges     Row Name 04/14/20 1234             Time Calculation    Start Time  0845  -SC      Stop Time  0910  -SC      Time Calculation (min)  25 min  -SC      PT Received On  04/14/20  -SC      PT - Next Appointment  04/15/20  -SC         Time Calculation- PT    Total Timed Code Minutes- PT  25 minute(s)  -SC         Timed Charges    46097 - Gait Training Minutes   8  -SC      69015 - PT Therapeutic Activity Minutes  17  -SC        User Key  (r) = Recorded By, (t) = Taken By, (c) = Cosigned By    Initials Name Provider Type    Lamar Wilson PTA Physical Therapy Assistant        Therapy Charges for Today     Code Description Service Date Service Provider Modifiers Qty    76490127972 HC GAIT TRAINING EA 15 MIN 4/14/2020 Lamar Bailey PTA GP 1    45362228559 HC PT THERAPEUTIC ACT EA 15 MIN 4/14/2020 Lamar Bailey PTA GP 1          PT G-Codes  Outcome Measure Options: AM-PAC 6 Clicks Basic Mobility (PT)  AM-PAC 6 Clicks Score (PT): 17    Lamar Bailey PTA  4/14/2020

## 2020-04-14 NOTE — DISCHARGE INSTRUCTIONS
Daily PT     pls wear brace when getting out of bed     Also wear the brace whenever up greater than 45 degrees for 15 minutes

## 2020-04-14 NOTE — NURSING NOTE
The patient's daughter called and was agitated because she said she was not called by the doctor and she is threatening to izabella the hospital.  She said she is suing the [facility] and now she will izabella [facility].  I ended the discussion at that time and transferred the phone call to the charge nurse.

## 2020-04-14 NOTE — DISCHARGE SUMMARY
[facility] Medicine Services  DISCHARGE SUMMARY        Prepared For PCP:  Otis Armstrong MD    Patient Name: Carly Swift  : 1933  MRN: [id number]      Date of Admission:   2020    Date of Discharge:  2020    Length of stay:  LOS: 6 days     Hospital Course     Presenting Problem:   Compression fracture of thoracic vertebra (CMS/HCC) [S22.000A]      Active Hospital Problems    Diagnosis  POA   • **Compression fracture of T12 vertebra (CMS/HCC) [S22.080A]  Yes   • Mixed anxiety and depressive disorder [F41.8]  Yes   • Hypokalemia [E87.6]  Yes   • Chronic diarrhea [K52.9]  Yes   • Compression fracture of thoracic vertebra (CMS/HCC) [S22.000A]  Yes   • Essential hypertension [I10]  Yes   • Persistent insomnia [G47.00]  Yes   • COPD (chronic obstructive pulmonary disease) (CMS/HCC) [J44.9]  Yes   • GERD (gastroesophageal reflux disease) [K21.9]  Yes   • Weakness [R53.1]  Yes      Resolved Hospital Problems   No resolved problems to display.     Primary discharge dx  Fall  Multiple compression fx including, T11,12, L2,  Severe anxiety/panic disorder  Progressive dementia with delirium      Secondary dx  Chronic insomnia  COPD  Osteoporosis  Chronic L3 compression fx  Chronic diarrhea  Recent weight loss  H/o PUD per family  HTN  GERD      Hospital Course:  Carly Swift is a 86 y.o. female  Who has been living with her daughter, presenting after fall due to back pain. She was found to have multiple compression fx, T11, T12, L2 with 60% height loss. Initially shown on [facility] from [facility]. It was repeated by neurosurgery since she was unable to get through MRI.  Initially vertebroplasty was planned but since pt is relatively not having much pain and able to ambulate though unsteady, medical management was recommended with TLSO brace on.     Per daughter, Tiesha, she has been losing lots of weight over 1yr due to diarrhea and poor oral intake. Also her dementia is  getting worse and she has been very anxious and panic with chronic insomnia.  She received xanax twice per daily in average with anxiety better controlled. Also getting hypnotics almost daily. She received zyprexa 2.5mg once with good relief of agitation.  This time xanax was prescribed as scheduled since it is helping her anxiety and zyprexa as scheduled at night , as long as she is not oversedated. Pt will benefit from these med due to severe anxiety, insomnia and sun downing. Hypnotics was ordered prn only to avoid polypharmacy.     For osteoporosis, paul D and calcium was prescribed    Overall pt is stabilized. Diarrhea resolved without imodium. Pt is improving in oral intake.   Stable for discharge. Will need fu with neurosurgery in 1month    TLSO brace was recommended for getting up and getting out of bed.                 Recommendation for Outpatient Providers:       Fu with PCP in 1wk  Fu with neurosurgery, Dr. Trent in 1month      Reasons For Change In Medications and Indications for New Medications:    As above- xanax, zyprexa, tylenol, calcium and vit D    Day of Discharge     HPI:       Vital Signs:   Temp:  [97.7 °F (36.5 °C)-98.9 °F (37.2 °C)] 97.7 °F (36.5 °C)  Heart Rate:  [62-67] 62  Resp:  [15-20] 18  BP: (145-164)/(67-76) 145/76     Physical Exam:  Physical Exam    Pertinent  and/or Most Recent Results     Results from last 7 days   Lab Units 04/09/20  0306 04/08/20  2301   WBC 10*3/mm3 6.80 8.10   HEMOGLOBIN g/dL 12.0 12.5   HEMATOCRIT % 36.1 36.2   PLATELETS 10*3/mm3 226 241   SODIUM mmol/L 144 145   POTASSIUM mmol/L 3.5 3.1*   CHLORIDE mmol/L 105 104   CO2 mmol/L 28.0 29.0   BUN mg/dL 7* 6*   CREATININE mg/dL 0.47* 0.47*   GLUCOSE mg/dL 81 87   CALCIUM mg/dL 8.6 8.9     Results from last 7 days   Lab Units 04/08/20  2301   BILIRUBIN mg/dL 0.4   ALK PHOS U/L 63   ALT (SGPT) U/L 10   AST (SGOT) U/L 17           Invalid input(s): TG, LDLCALC, LDLREALC        Brief Urine Lab Results  (Last result  in the past 365 days)      Color   Clarity   Blood   Leuk Est   Nitrite   Protein   CREAT   Urine HCG        04/10/20 1447 Yellow Clear Negative Negative Negative Negative               Microbiology Results Abnormal     None          Xr Spine Lumbar Complete With Flex & Ext    Result Date: 4/10/2020  Impression: 1.Compression fractures at T11, T12, and L2 are unchanged from recent prior CT. 2.No evidence of spinal instability with flexion or extension. 3.Degenerative changes as described above.  Electronically Signed By-DR. Dano Menendez MD On:4/10/2020 12:23 PM This report was finalized on [id number] by DR. Dano Menendez MD.    Ct Thoracic Spine Without Contrast    Result Date: 4/10/2020  Impression: 1.Severe compression fracture at T12, and mild compression fracture at T11, which may be subacute. No prior imaging is available for comparison. Given history of breast cancer, pathological fractures cannot be excluded. 2.Severe mottling of sternum and manubrium. Differential considerations include postradiation changes, osseous metastasis, or sequela of prior trauma. 3.Mild degenerative changes of thoracic spine as described above. 4.5 mm right lower lobe nodule. Recommend follow-up CT chest in 3-6 months. 5.Small right pleural effusion with right basilar atelectasis.  Electronically Signed By-DR. Dano Menendez MD On:4/10/2020 10:51 AM This report was finalized on [id number] by DR. Dano Menendez MD.    Ct Lumbar Spine Without Contrast    Result Date: 4/10/2020  Impression: 1.Moderate compression fracture at L2 with associated sclerosis, which could be subacute. Given history of breast cancer, a pathological fracture cannot be excluded. 2.Multilevel degenerative changes of lumbar spine as described above.  Electronically Signed By-DR. Dano Menendez MD On:4/10/2020 10:58 AM This report was finalized on [id number] by DR. Dano Menendez MD.                             Test Results Pending at  Discharge    NA    Procedures Performed           Consults:   Consults     Date and Time Order Name Status Description    4/8/2020 2223 Inpatient Neurosurgery Consult Completed             Discharge Details        Discharge Medications      New Medications      Instructions Start Date   ALPRAZolam 0.25 MG tablet  Commonly known as:  XANAX   0.25 mg, Oral, 2 Times Daily, Qam and 1300 Hold for oversedation      calcium carbonate 500 MG chewable tablet  Commonly known as:  TUMS   1 tablet, Oral, Daily   Start Date:  April 15, 2020     cholecalciferol 25 MCG (1000 UT) tablet  Commonly known as:  VITAMIN D3   1,000 Units, Oral, Daily   Start Date:  April 15, 2020     lidocaine 5 %  Commonly known as:  LIDODERM   1 patch, Transdermal, Every 24 Hours Scheduled, Remove & Discard patch within 12 hours or as directed by MD   Start Date:  April 15, 2020     OLANZapine 2.5 MG tablet  Commonly known as:  zyPREXA   2.5 mg, Oral, Nightly, Hold for oversedation      pantoprazole 40 MG EC tablet  Commonly known as:  PROTONIX   40 mg, Oral, Daily         Continue These Medications      Instructions Start Date   temazepam 15 MG capsule  Commonly known as:  RESTORIL   15 mg, Oral, Nightly PRN         Stop These Medications    colestipol 1 g tablet  Commonly known as:  COLESTID            No Known Allergies      Discharge Disposition:  Skilled Nursing Facility (DC - External)    Diet:  Hospital:  Diet Order   Procedures   • Diet Regular         Discharge Activity:         CODE STATUS:    Code Status and Medical Interventions:   Ordered at: 04/08/20 2223     Level Of Support Discussed With:    Patient     Code Status:    No CPR     Medical Interventions (Level of Support Prior to Arrest):    Comfort Measures         Follow-up Appointments  No future appointments.          Condition on Discharge:      Stable       Electronically signed by Margarita Thomas MD, 04/14/20, 9:36 AM.      Time: I spent  35  minutes on this discharge activity which  included face-to-face encounter with the patient/reviewing the data in the system/coordination of the care with the nursing staff as well as consultants/documentation/entering orders.

## 2020-04-14 NOTE — PLAN OF CARE
Problem: Patient Care Overview  Goal: Plan of Care Review  Outcome: Ongoing (interventions implemented as appropriate)  Flowsheets (Taken 4/14/2020 1225)  Progress: improving  Plan of Care Reviewed With: patient  Outcome Summary: pt was able to roll in the bed, log roll to eob and progress to gait training with minimal assist today.  pt's back brace fit better today except for the sternal extension which causes some discomfort.  pt motivated and has good potential to cont to improve with physical therapy services.  Recommend IP rehab at d/c to allow pt to be able to recover her mobility.  PPE used, mask and gloves

## 2020-04-15 NOTE — PROGRESS NOTES
Discharge Planning Assessment   [facility]     Patient Name: Carly Swift  MRN: [id number]  Today's Date: 4/15/2020    Admit Date: 4/8/2020          Plan    Final Discharge Disposition Code  03 - skilled nursing facility (SNF)    Final Note  to [facility] for rehab       Carol naegele rn  Case management  Office number [phone]  Cell phone [phone]

## 2020-11-02 RX ORDER — APIXABAN 5 MG/1
TABLET, FILM COATED ORAL
Qty: 60 TABLET | OUTPATIENT
Start: 2020-11-02

## 2021-02-08 RX ORDER — APIXABAN 5 MG/1
TABLET, FILM COATED ORAL
Qty: 60 TABLET | Refills: 2 | OUTPATIENT
Start: 2021-02-08